# Patient Record
Sex: MALE | Race: WHITE | NOT HISPANIC OR LATINO | Employment: OTHER | URBAN - METROPOLITAN AREA
[De-identification: names, ages, dates, MRNs, and addresses within clinical notes are randomized per-mention and may not be internally consistent; named-entity substitution may affect disease eponyms.]

---

## 2018-06-29 PROCEDURE — 99291 CRITICAL CARE FIRST HOUR: CPT

## 2018-06-30 ENCOUNTER — HOSPITAL ENCOUNTER (INPATIENT)
Facility: MEDICAL CENTER | Age: 83
LOS: 3 days | DRG: 857 | End: 2018-07-03
Attending: EMERGENCY MEDICINE | Admitting: HOSPITALIST
Payer: MEDICARE

## 2018-06-30 ENCOUNTER — HOSPITAL ENCOUNTER (OUTPATIENT)
Dept: RADIOLOGY | Facility: MEDICAL CENTER | Age: 83
End: 2018-06-30

## 2018-06-30 DIAGNOSIS — T81.40XA POSTOPERATIVE INFECTION, INITIAL ENCOUNTER: ICD-10-CM

## 2018-06-30 PROBLEM — L02.519 CELLULITIS AND ABSCESS OF HAND: Status: ACTIVE | Noted: 2018-06-30

## 2018-06-30 PROBLEM — L03.119 CELLULITIS AND ABSCESS OF HAND: Status: ACTIVE | Noted: 2018-06-30

## 2018-06-30 PROBLEM — I25.10 CAD (CORONARY ARTERY DISEASE): Status: ACTIVE | Noted: 2018-06-30

## 2018-06-30 PROBLEM — I48.91 ATRIAL FIBRILLATION (HCC): Status: ACTIVE | Noted: 2018-06-30

## 2018-06-30 LAB
ANION GAP SERPL CALC-SCNC: 10 MMOL/L (ref 0–11.9)
BASOPHILS # BLD AUTO: 0.7 % (ref 0–1.8)
BASOPHILS # BLD: 0.04 K/UL (ref 0–0.12)
BUN SERPL-MCNC: 20 MG/DL (ref 8–22)
CALCIUM SERPL-MCNC: 9.1 MG/DL (ref 8.5–10.5)
CHLORIDE SERPL-SCNC: 107 MMOL/L (ref 96–112)
CO2 SERPL-SCNC: 21 MMOL/L (ref 20–33)
CREAT SERPL-MCNC: 1.21 MG/DL (ref 0.5–1.4)
CRP SERPL HS-MCNC: 1.92 MG/DL (ref 0–0.75)
EOSINOPHIL # BLD AUTO: 0.2 K/UL (ref 0–0.51)
EOSINOPHIL NFR BLD: 3.5 % (ref 0–6.9)
ERYTHROCYTE [DISTWIDTH] IN BLOOD BY AUTOMATED COUNT: 42.9 FL (ref 35.9–50)
ERYTHROCYTE [SEDIMENTATION RATE] IN BLOOD BY WESTERGREN METHOD: 50 MM/HOUR (ref 0–20)
EST. AVERAGE GLUCOSE BLD GHB EST-MCNC: 128 MG/DL
GLUCOSE SERPL-MCNC: 110 MG/DL (ref 65–99)
GRAM STN SPEC: NORMAL
GRAM STN SPEC: NORMAL
HBA1C MFR BLD: 6.1 % (ref 0–5.6)
HCT VFR BLD AUTO: 35.8 % (ref 42–52)
HGB BLD-MCNC: 11.8 G/DL (ref 14–18)
IMM GRANULOCYTES # BLD AUTO: 0.01 K/UL (ref 0–0.11)
IMM GRANULOCYTES NFR BLD AUTO: 0.2 % (ref 0–0.9)
LYMPHOCYTES # BLD AUTO: 1.25 K/UL (ref 1–4.8)
LYMPHOCYTES NFR BLD: 22 % (ref 22–41)
MCH RBC QN AUTO: 31.4 PG (ref 27–33)
MCHC RBC AUTO-ENTMCNC: 33 G/DL (ref 33.7–35.3)
MCV RBC AUTO: 95.2 FL (ref 81.4–97.8)
MONOCYTES # BLD AUTO: 0.53 K/UL (ref 0–0.85)
MONOCYTES NFR BLD AUTO: 9.3 % (ref 0–13.4)
NEUTROPHILS # BLD AUTO: 3.65 K/UL (ref 1.82–7.42)
NEUTROPHILS NFR BLD: 64.3 % (ref 44–72)
NRBC # BLD AUTO: 0 K/UL
NRBC BLD-RTO: 0 /100 WBC
PLATELET # BLD AUTO: 228 K/UL (ref 164–446)
PMV BLD AUTO: 9.1 FL (ref 9–12.9)
POTASSIUM SERPL-SCNC: 4 MMOL/L (ref 3.6–5.5)
RBC # BLD AUTO: 3.76 M/UL (ref 4.7–6.1)
SIGNIFICANT IND 70042: NORMAL
SIGNIFICANT IND 70042: NORMAL
SITE SITE: NORMAL
SITE SITE: NORMAL
SODIUM SERPL-SCNC: 138 MMOL/L (ref 135–145)
SOURCE SOURCE: NORMAL
SOURCE SOURCE: NORMAL
WBC # BLD AUTO: 5.7 K/UL (ref 4.8–10.8)

## 2018-06-30 PROCEDURE — 85025 COMPLETE CBC W/AUTO DIFF WBC: CPT

## 2018-06-30 PROCEDURE — 501838 HCHG SUTURE GENERAL: Performed by: ORTHOPAEDIC SURGERY

## 2018-06-30 PROCEDURE — A9270 NON-COVERED ITEM OR SERVICE: HCPCS | Performed by: ORTHOPAEDIC SURGERY

## 2018-06-30 PROCEDURE — 83036 HEMOGLOBIN GLYCOSYLATED A1C: CPT

## 2018-06-30 PROCEDURE — 160048 HCHG OR STATISTICAL LEVEL 1-5: Performed by: ORTHOPAEDIC SURGERY

## 2018-06-30 PROCEDURE — 87075 CULTR BACTERIA EXCEPT BLOOD: CPT

## 2018-06-30 PROCEDURE — A6407 PACKING STRIPS, NON-IMPREG: HCPCS | Performed by: ORTHOPAEDIC SURGERY

## 2018-06-30 PROCEDURE — 160002 HCHG RECOVERY MINUTES (STAT): Performed by: ORTHOPAEDIC SURGERY

## 2018-06-30 PROCEDURE — 87015 SPECIMEN INFECT AGNT CONCNTJ: CPT

## 2018-06-30 PROCEDURE — 160038 HCHG SURGERY MINUTES - EA ADDL 1 MIN LEVEL 2: Performed by: ORTHOPAEDIC SURGERY

## 2018-06-30 PROCEDURE — 87070 CULTURE OTHR SPECIMN AEROBIC: CPT

## 2018-06-30 PROCEDURE — 0JDK0ZZ EXTRACTION OF LEFT HAND SUBCUTANEOUS TISSUE AND FASCIA, OPEN APPROACH: ICD-10-PCS | Performed by: ORTHOPAEDIC SURGERY

## 2018-06-30 PROCEDURE — 99222 1ST HOSP IP/OBS MODERATE 55: CPT | Mod: AI | Performed by: HOSPITALIST

## 2018-06-30 PROCEDURE — 500881 HCHG PACK, EXTREMITY: Performed by: ORTHOPAEDIC SURGERY

## 2018-06-30 PROCEDURE — 700111 HCHG RX REV CODE 636 W/ 250 OVERRIDE (IP): Performed by: HOSPITALIST

## 2018-06-30 PROCEDURE — 86140 C-REACTIVE PROTEIN: CPT

## 2018-06-30 PROCEDURE — 85652 RBC SED RATE AUTOMATED: CPT

## 2018-06-30 PROCEDURE — 700105 HCHG RX REV CODE 258: Performed by: HOSPITALIST

## 2018-06-30 PROCEDURE — A9270 NON-COVERED ITEM OR SERVICE: HCPCS | Performed by: HOSPITALIST

## 2018-06-30 PROCEDURE — 700102 HCHG RX REV CODE 250 W/ 637 OVERRIDE(OP)

## 2018-06-30 PROCEDURE — 160027 HCHG SURGERY MINUTES - 1ST 30 MINS LEVEL 2: Performed by: ORTHOPAEDIC SURGERY

## 2018-06-30 PROCEDURE — 700102 HCHG RX REV CODE 250 W/ 637 OVERRIDE(OP): Performed by: HOSPITALIST

## 2018-06-30 PROCEDURE — 87205 SMEAR GRAM STAIN: CPT

## 2018-06-30 PROCEDURE — 160035 HCHG PACU - 1ST 60 MINS PHASE I: Performed by: ORTHOPAEDIC SURGERY

## 2018-06-30 PROCEDURE — 80048 BASIC METABOLIC PNL TOTAL CA: CPT

## 2018-06-30 PROCEDURE — 700111 HCHG RX REV CODE 636 W/ 250 OVERRIDE (IP)

## 2018-06-30 PROCEDURE — 700102 HCHG RX REV CODE 250 W/ 637 OVERRIDE(OP): Performed by: ORTHOPAEDIC SURGERY

## 2018-06-30 PROCEDURE — A9270 NON-COVERED ITEM OR SERVICE: HCPCS

## 2018-06-30 PROCEDURE — 160009 HCHG ANES TIME/MIN: Performed by: ORTHOPAEDIC SURGERY

## 2018-06-30 PROCEDURE — 770006 HCHG ROOM/CARE - MED/SURG/GYN SEMI*

## 2018-06-30 PROCEDURE — 160036 HCHG PACU - EA ADDL 30 MINS PHASE I: Performed by: ORTHOPAEDIC SURGERY

## 2018-06-30 PROCEDURE — 501330 HCHG SET, CYSTO IRRIG TUBING: Performed by: ORTHOPAEDIC SURGERY

## 2018-06-30 PROCEDURE — 94760 N-INVAS EAR/PLS OXIMETRY 1: CPT

## 2018-06-30 RX ORDER — ASPIRIN 81 MG/1
81 TABLET, CHEWABLE ORAL DAILY
COMMUNITY
End: 2023-07-13

## 2018-06-30 RX ORDER — METOPROLOL TARTRATE 50 MG/1
50 TABLET, FILM COATED ORAL 2 TIMES DAILY
Status: DISCONTINUED | OUTPATIENT
Start: 2018-06-30 | End: 2018-06-30

## 2018-06-30 RX ORDER — BISACODYL 10 MG
10 SUPPOSITORY, RECTAL RECTAL
Status: DISCONTINUED | OUTPATIENT
Start: 2018-06-30 | End: 2018-07-03 | Stop reason: HOSPADM

## 2018-06-30 RX ORDER — SODIUM CHLORIDE 9 MG/ML
INJECTION, SOLUTION INTRAVENOUS CONTINUOUS
Status: DISCONTINUED | OUTPATIENT
Start: 2018-06-30 | End: 2018-07-03 | Stop reason: HOSPADM

## 2018-06-30 RX ORDER — ATORVASTATIN CALCIUM 40 MG/1
40 TABLET, FILM COATED ORAL
Status: DISCONTINUED | OUTPATIENT
Start: 2018-06-30 | End: 2018-07-03 | Stop reason: HOSPADM

## 2018-06-30 RX ORDER — ASPIRIN 81 MG/1
81 TABLET, CHEWABLE ORAL DAILY
Status: DISCONTINUED | OUTPATIENT
Start: 2018-06-30 | End: 2018-07-03 | Stop reason: HOSPADM

## 2018-06-30 RX ORDER — MELOXICAM 10 MG/1
20 CAPSULE ORAL DAILY
COMMUNITY
End: 2023-07-13

## 2018-06-30 RX ORDER — VERAPAMIL HYDROCHLORIDE 240 MG/1
240 TABLET, FILM COATED, EXTENDED RELEASE ORAL DAILY
COMMUNITY
End: 2023-07-13

## 2018-06-30 RX ORDER — ASPIRIN 325 MG
325 TABLET ORAL EVERY 6 HOURS PRN
Status: DISCONTINUED | OUTPATIENT
Start: 2018-06-30 | End: 2018-07-03 | Stop reason: HOSPADM

## 2018-06-30 RX ORDER — AMOXICILLIN 250 MG
2 CAPSULE ORAL 2 TIMES DAILY
Status: DISCONTINUED | OUTPATIENT
Start: 2018-06-30 | End: 2018-07-03 | Stop reason: HOSPADM

## 2018-06-30 RX ORDER — ACETAMINOPHEN 325 MG/1
650 TABLET ORAL EVERY 6 HOURS PRN
Status: DISCONTINUED | OUTPATIENT
Start: 2018-06-30 | End: 2018-07-03 | Stop reason: HOSPADM

## 2018-06-30 RX ORDER — ATORVASTATIN CALCIUM 20 MG/1
40 TABLET, FILM COATED ORAL NIGHTLY
COMMUNITY
End: 2023-07-13

## 2018-06-30 RX ORDER — OXYCODONE HCL 5 MG/5 ML
SOLUTION, ORAL ORAL
Status: COMPLETED
Start: 2018-06-30 | End: 2018-06-30

## 2018-06-30 RX ORDER — ONDANSETRON 4 MG/1
4 TABLET, ORALLY DISINTEGRATING ORAL EVERY 4 HOURS PRN
Status: DISCONTINUED | OUTPATIENT
Start: 2018-06-30 | End: 2018-07-03 | Stop reason: HOSPADM

## 2018-06-30 RX ORDER — VERAPAMIL HYDROCHLORIDE 240 MG/1
240 TABLET, FILM COATED, EXTENDED RELEASE ORAL
Status: DISCONTINUED | OUTPATIENT
Start: 2018-06-30 | End: 2018-07-03 | Stop reason: HOSPADM

## 2018-06-30 RX ORDER — MAGNESIUM HYDROXIDE 1200 MG/15ML
LIQUID ORAL
Status: COMPLETED | OUTPATIENT
Start: 2018-06-30 | End: 2018-06-30

## 2018-06-30 RX ORDER — ONDANSETRON 2 MG/ML
4 INJECTION INTRAMUSCULAR; INTRAVENOUS EVERY 4 HOURS PRN
Status: DISCONTINUED | OUTPATIENT
Start: 2018-06-30 | End: 2018-07-03 | Stop reason: HOSPADM

## 2018-06-30 RX ORDER — CLOPIDOGREL BISULFATE 75 MG/1
75 TABLET ORAL DAILY
Status: DISCONTINUED | OUTPATIENT
Start: 2018-06-30 | End: 2018-06-30

## 2018-06-30 RX ORDER — POLYETHYLENE GLYCOL 3350 17 G/17G
1 POWDER, FOR SOLUTION ORAL
Status: DISCONTINUED | OUTPATIENT
Start: 2018-06-30 | End: 2018-07-03 | Stop reason: HOSPADM

## 2018-06-30 RX ORDER — SIMVASTATIN 20 MG
40 TABLET ORAL EVERY EVENING
Status: DISCONTINUED | OUTPATIENT
Start: 2018-06-30 | End: 2018-06-30

## 2018-06-30 RX ADMIN — STANDARDIZED SENNA CONCENTRATE AND DOCUSATE SODIUM 2 TABLET: 8.6; 5 TABLET, FILM COATED ORAL at 20:31

## 2018-06-30 RX ADMIN — AMPICILLIN SODIUM AND SULBACTAM SODIUM 3 G: 2; 1 INJECTION, POWDER, FOR SOLUTION INTRAMUSCULAR; INTRAVENOUS at 17:23

## 2018-06-30 RX ADMIN — FENTANYL CITRATE 50 MCG: 50 INJECTION, SOLUTION INTRAMUSCULAR; INTRAVENOUS at 03:25

## 2018-06-30 RX ADMIN — AMPICILLIN SODIUM AND SULBACTAM SODIUM 3 G: 2; 1 INJECTION, POWDER, FOR SOLUTION INTRAMUSCULAR; INTRAVENOUS at 11:26

## 2018-06-30 RX ADMIN — ATORVASTATIN CALCIUM 40 MG: 40 TABLET, FILM COATED ORAL at 20:31

## 2018-06-30 RX ADMIN — AMPICILLIN SODIUM AND SULBACTAM SODIUM 3 G: 2; 1 INJECTION, POWDER, FOR SOLUTION INTRAMUSCULAR; INTRAVENOUS at 06:30

## 2018-06-30 RX ADMIN — FENTANYL CITRATE 50 MCG: 50 INJECTION, SOLUTION INTRAMUSCULAR; INTRAVENOUS at 03:15

## 2018-06-30 RX ADMIN — SODIUM CHLORIDE: 9 INJECTION, SOLUTION INTRAVENOUS at 04:10

## 2018-06-30 RX ADMIN — VANCOMYCIN HYDROCHLORIDE 2100 MG: 100 INJECTION, POWDER, LYOPHILIZED, FOR SOLUTION INTRAVENOUS at 04:52

## 2018-06-30 RX ADMIN — ASPIRIN 81 MG: 81 TABLET, CHEWABLE ORAL at 11:20

## 2018-06-30 RX ADMIN — VERAPAMIL HYDROCHLORIDE 240 MG: 240 TABLET, FILM COATED, EXTENDED RELEASE ORAL at 11:20

## 2018-06-30 RX ADMIN — RIVAROXABAN 20 MG: 20 TABLET, FILM COATED ORAL at 17:23

## 2018-06-30 RX ADMIN — SODIUM CHLORIDE: 9 INJECTION, SOLUTION INTRAVENOUS at 16:19

## 2018-06-30 RX ADMIN — OXYCODONE HYDROCHLORIDE 10 MG: 5 SOLUTION ORAL at 03:12

## 2018-06-30 ASSESSMENT — COGNITIVE AND FUNCTIONAL STATUS - GENERAL
SUGGESTED CMS G CODE MODIFIER MOBILITY: CH
DAILY ACTIVITIY SCORE: 24
SUGGESTED CMS G CODE MODIFIER DAILY ACTIVITY: CH
MOBILITY SCORE: 24

## 2018-06-30 ASSESSMENT — PAIN SCALES - GENERAL
PAINLEVEL_OUTOF10: 0
PAINLEVEL_OUTOF10: 6
PAINLEVEL_OUTOF10: 0
PAINLEVEL_OUTOF10: 0
PAINLEVEL_OUTOF10: 4
PAINLEVEL_OUTOF10: 4

## 2018-06-30 ASSESSMENT — LIFESTYLE VARIABLES
TOTAL SCORE: 0
ALCOHOL_USE: YES
TOTAL SCORE: 0
HAVE PEOPLE ANNOYED YOU BY CRITICIZING YOUR DRINKING: NO
HAVE YOU EVER FELT YOU SHOULD CUT DOWN ON YOUR DRINKING: NO
EVER FELT BAD OR GUILTY ABOUT YOUR DRINKING: NO
EVER_SMOKED: NEVER
TOTAL SCORE: 0
ON A TYPICAL DAY WHEN YOU DRINK ALCOHOL HOW MANY DRINKS DO YOU HAVE: 1
CONSUMPTION TOTAL: NEGATIVE
EVER HAD A DRINK FIRST THING IN THE MORNING TO STEADY YOUR NERVES TO GET RID OF A HANGOVER: NO
HOW MANY TIMES IN THE PAST YEAR HAVE YOU HAD 5 OR MORE DRINKS IN A DAY: 0
AVERAGE NUMBER OF DAYS PER WEEK YOU HAVE A DRINK CONTAINING ALCOHOL: 4
EVER_SMOKED: NEVER

## 2018-06-30 ASSESSMENT — COPD QUESTIONNAIRES
COPD SCREENING SCORE: 2
IN THE PAST 12 MONTHS DO YOU DO LESS THAN YOU USED TO BECAUSE OF YOUR BREATHING PROBLEMS: DISAGREE/UNSURE
HAVE YOU SMOKED AT LEAST 100 CIGARETTES IN YOUR ENTIRE LIFE: NO/DON'T KNOW
COPD SCREENING SCORE: 2
DURING THE PAST 4 WEEKS HOW MUCH DID YOU FEEL SHORT OF BREATH: NONE/LITTLE OF THE TIME
HAVE YOU SMOKED AT LEAST 100 CIGARETTES IN YOUR ENTIRE LIFE: NO/DON'T KNOW
DO YOU EVER COUGH UP ANY MUCUS OR PHLEGM?: NO/ONLY WITH OCCASIONAL COLDS OR INFECTIONS
DO YOU EVER COUGH UP ANY MUCUS OR PHLEGM?: NO/ONLY WITH OCCASIONAL COLDS OR INFECTIONS
DURING THE PAST 4 WEEKS HOW MUCH DID YOU FEEL SHORT OF BREATH: NONE/LITTLE OF THE TIME

## 2018-06-30 ASSESSMENT — ENCOUNTER SYMPTOMS
CONSTITUTIONAL NEGATIVE: 1
PSYCHIATRIC NEGATIVE: 1
EYES NEGATIVE: 1
GASTROINTESTINAL NEGATIVE: 1
RESPIRATORY NEGATIVE: 1
CARDIOVASCULAR NEGATIVE: 1
NEUROLOGICAL NEGATIVE: 1

## 2018-06-30 ASSESSMENT — PAIN SCALES - WONG BAKER
WONGBAKER_NUMERICALRESPONSE: DOESN'T HURT AT ALL
WONGBAKER_NUMERICALRESPONSE: DOESN'T HURT AT ALL

## 2018-06-30 ASSESSMENT — PATIENT HEALTH QUESTIONNAIRE - PHQ9
SUM OF ALL RESPONSES TO PHQ9 QUESTIONS 1 AND 2: 0
1. LITTLE INTEREST OR PLEASURE IN DOING THINGS: NOT AT ALL
2. FEELING DOWN, DEPRESSED, IRRITABLE, OR HOPELESS: NOT AT ALL

## 2018-06-30 NOTE — ED NOTES
PT IS AAOX4, PT WAS PLACED IN ER 11. PT HAS FAMILY AT BEDSIDE. PT HAS BILT. IVS.  PTS CD WAS SENT TO XRAY.

## 2018-06-30 NOTE — PROGRESS NOTES
" Subjective:      No events since surgery.  Pain well controlled and moving hand freely.  No fevers or chills.    Objective:  Blood pressure 130/78, pulse 86, temperature 36.8 °C (98.3 °F), resp. rate 16, height 1.854 m (6' 1\"), weight 85 kg (187 lb 6.3 oz), SpO2 98 %.    Recent Labs      06/30/18   0121   WBC  5.7   RBC  3.76*   HEMOGLOBIN  11.8*   HEMATOCRIT  35.8*   MCV  95.2   MCH  31.4   MCHC  33.0*   RDW  42.9   PLATELETCT  228   MPV  9.1     Recent Labs      06/30/18   0121   SODIUM  138   POTASSIUM  4.0   CHLORIDE  107   CO2  21   GLUCOSE  110*   BUN  20   CREATININE  1.21   CALCIUM  9.1         Intake/Output Summary (Last 24 hours) at 06/30/18 0706  Last data filed at 06/30/18 0300   Gross per 24 hour   Intake              900 ml   Output               25 ml   Net              875 ml       Comfortable, no distress  Neurologically and vascularly intact with <2s cap refill and intact SLT  Dressing C/D/I    Assessment:      Doing well s/p left hand I&D    Plan:    Abx per ID - soft tissue infection only with good debridement  Dressing changes beginning tomorrow - Daily iodoform packing changes with compressive stocking  F/U with me at [facility] in 2 weeks for suture removal and wound check [phone]    "

## 2018-06-30 NOTE — ASSESSMENT & PLAN NOTE
Unclear if paroxysmal or not, continue with current rate control therapy, anticoagulated with xarelto

## 2018-06-30 NOTE — OR NURSING
Pt's spouse stated that pt had taken lopressor in past and felt unwell and pcp change bp medication to verapamil. ENMANUEL Bardales on ortho notified.

## 2018-06-30 NOTE — OP REPORT
Pre-operative Dx: 1) Left hand infection in the setting of recent thumb trigger finger release with extension into first webspace.  2) CAD with stents.  3) Afib on Xarelto  Post-operative Dx: same   Procedure:  Irrigation and debridement of left hand infection  Surgeon:  Dr. Tien Ta MD  Assistants: None  Anesthesia:  General  EBL:  Minimal  Drains:  Iodoform gauze packing  Complications:  None    Findings: Gross purulence.  Tendon in good condition.  The infection tracked into the first webspace requiring a dorsal incision for drainage and debridement in addition to the previous incision.    Indications: He is a pleasant 83 y.o. male who had trigger finger releases to the thumb and long finger of his left hand about 3 weeks ago at [facility].  He had his sutures removed about a week ago.  Everything was doing well until yesterday when he had significant swelling and pain in the left hand, about the first webspace, with purulent drainage.  He was started on Bactrim PO on Wednesday, but it continued to worsen.  He presented to the [facility] ED where they expressed some more purulence and gave him some IV antibiotics.  We discussed the options.  Given the proximity to the flexor tendon and the considerable collection with abundant purulence, I recommended I&D.  Discussed the risks of bleeding, neurovascular injury, persistent wound problems or infection, pain, and medical complications.  He elected to proceed.  He was NPO since noon.     Description of Procedure:      The patient was identified in the preoperative holding area and informed consent and site marking were confirmed. The patient was then brought to the operating room. Anesthesia was administered. Patient was positioned supine on the operative table with appropriate padding of the extremities. Sterile prepping and draping of the surgical field was completed. I performed a pre timeout to confirm we had the correct patient, side, site, presence of  necessary personal, and equipment. IV Ancef was administered.       His previous incision in the thumb flexor crease was reopened. There was a significant amount of purulence present.  Tissues were spread with scissors to get an idea for the extent of the soft tissue infection.  It tracked into the first webspace and extended into the dorsal hand.  A separate longitudinal incision was made dorsally, and additional purulence was encountered.  The purulent and necrotic tissue was debrided gently.  The superficial radial sensory nerve was not visible.  The wound was then copiously irrigated with saline through both incisions.  The tendon was exposed.  The A1 pulley had been released and the tendon was in good condition.  Hemostasis was obtained.  The dorsal wound was closed with nylon sutures.  The palmar incision was closed half way, then the remainder packed with iodoform gauze to fill the dead space and prevent abscess re accumulation.  A sterile compressive dressing was applied and he was turned over to anesthesia in stable condition.    Postoperative plan:  Elevate.  IV abx pending culture results, which may be negative as he's been on abx for more than 24 hours now.  ID consult.  OT consult for ROM and dressing education and compression.  Daily dressing changes including iodoform gauze packing.  F/U with me in 2 weeks for a wound check.  OK to continue Xarelto for afib.

## 2018-06-30 NOTE — PROGRESS NOTES
Pharmacy Kinetics Addendum:    83 y.o. male on vancomycin day # 1   6/30/2018    Currently on Vancomycin 1300 mg iv q24hr to start 7/1 (0500)   6/30: Vanco load 2100 mg IV at 0452    Indication for Treatment: Empiric - L hand post-surgical site infection    Pertinent cultures to date:  6/30: L hand tissue cx - in process    Recent Labs      06/30/18   0121   BUN  20   CREATININE  1.21     No results for input(s): VANCOTROUGH, VANCOPEAK, VANCORANDOM in the last 72 hours.    A/P   1. Vancomycin dose change: Continue current regimen  2. Next vancomycin level: 2 days (not currently ordered)  3. Goal trough: 12-16 mcg/mL  4. Comments: Reviewed AM note for vancomycin dosing and agree with prior pharmacist's evaluation & vanco dosing. Cultures in process and repeat renal labs ordered for AM per admitting MD. Will postpone vanco trough until 7/2 to evaluate vanco clearance closer to steady state. Will continue to follow cultures and recommend de-escalation of antibiotics if continued MRSA coverage is no longer indicated.    Pharmacy will continue to follow.     Mona Herman, RickD

## 2018-06-30 NOTE — ED TRIAGE NOTES
"Miguel Reyna  83 y.o. male  Chief Complaint   Patient presents with   • Wound Check     \"I had finger surgery June 4th and today my doctor sent me here because they said it was very infected and swollen. They said they want me to see an infectious disease MD.\" Pt moving all fingers, denies pain.    • Sent by MD       Pt amb to triage with steady gait for above complaint.   Pt is alert and oriented, speaking in full sentences, follows commands and responds appropriately to questions. NAD. Resp are even and unlabored.    Pt placed in senior lounge. Pt educated on triage process. Pt encouraged to alert staff for any changes.    "

## 2018-06-30 NOTE — ED PROVIDER NOTES
ED Provider Note  Chief Complaint:   Postoperative infection    HPI:  Miguel Reyna is a 83 y.o. male who presents with chief complaint of hand infection.  He had a trigger finger release procedure performed 6/4/18 that was done in [place].  He had normal healing since that time until about 3 days ago when he developed some swelling to his left hand that is progressively worsened since time of onset.  Additionally, he had some drainage from the area.  He was seen by his primary care physician yesterday and placed on antibiotics.  On review of prior emergency department records, this is listed as Bactrim and levofloxacin.  Symptoms progressively worsened, he was referred to [facility] emergency department.  Out of concern for infection, he was transferred to our facility for specialty orthopedic services not available at that facility.    He had no associated fevers.  Pain and swelling are worse with movement.  He denies left elbow pain, denies left wrist pain.  Denies history of diabetes or hyperglycemia.    Review of Systems:  See HPI for pertinent positives and negatives.    Past Medical History:   has a past medical history of Myocardial infarct (9/24/2011).    Social History:  Social History     Social History Main Topics   • Smoking status: Not on file   • Smokeless tobacco: Not on file   • Alcohol use Not on file   • Drug use: Unknown   • Sexual activity: Not on file       Surgical History:   has a past surgical history that includes other orthopedic surgery (1988).    Current Medications:  Home Medications     Reviewed by Shayna Sinha R.N. (Registered Nurse) on 06/30/18 at 0418  Med List Status: Not Addressed   Medication Last Dose Status   ascorbic acid (ASCORBIC ACID) 500 MG TABS 6/29/2018 Active   aspirin (ASA) 325 MG TABS 6/29/2018 Active   clopidogrel (PLAVIX) 75 MG TABS  Active   metoprolol (LOPRESSOR) 50 MG TABS  Active   simvastatin (ZOCOR) 40 MG TABS 6/29/2018 Active   therapeutic  "multivitamin-minerals (THERAGRAN-M) TABS 6/29/2018 Active                Allergies:  No Known Allergies    Physical Exam:  Vital Signs: /67   Pulse 91   Temp 36.5 °C (97.7 °F)   Resp (!) 23   Ht 1.854 m (6' 1\")   Wt 85 kg (187 lb 6.3 oz)   SpO2 97%   BMI 24.72 kg/m²   Constitutional: Alert, no acute distress  HENT: Normocephalic, atraumatic  Neck: Supple  Cardiovascular: Left upper extremity is well perfused  Pulmonary: No respiratory distress, normal work of breathing  Musculoskeletal: Patient arrives with bandage in place to the left hand.  He was taken to the operating room by Dr. Ta, orthopedic surgeon before I was able to remove the bandage for full examination.  Psychiatric: Normal and appropriate mood and affect    Medical records reviewed from [facility].  CT of the hand demonstrates air raising concern for gas-forming organism.  White blood count is 7.9.  CT demonstrates collection of air 1.5 x 1 cm area between the thumb and second finger, residual air from surgery or gas-forming infectious process.  No abscess.    Labs:  Labs Reviewed   CBC WITH DIFFERENTIAL - Abnormal; Notable for the following:        Result Value    RBC 3.76 (*)     Hemoglobin 11.8 (*)     Hematocrit 35.8 (*)     MCHC 33.0 (*)     All other components within normal limits   BASIC METABOLIC PANEL - Abnormal; Notable for the following:     Glucose 110 (*)     All other components within normal limits   WESTERGREN SED RATE - Abnormal; Notable for the following:     Sed Rate Westergren 50 (*)     All other components within normal limits   CRP QUANTITIVE (NON-CARDIAC) - Abnormal; Notable for the following:     Stat C-Reactive Protein 1.92 (*)     All other components within normal limits   ESTIMATED GFR - Abnormal; Notable for the following:     GFR If Non  57 (*)     All other components within normal limits   HEMOGLOBIN A1C       Radiology:  OUTSIDE IMAGES-CT EXTREMITY LEFT   Final Result           ED " Medications Administered:  Medications   aspirin (ASA) tablet 325 mg (not administered)   clopidogrel (PLAVIX) tablet 75 mg (not administered)   metoprolol (LOPRESSOR) tablet 50 mg (not administered)   simvastatin (ZOCOR) tablet 40 mg (not administered)   NS infusion ( Intravenous New Bag 6/30/18 0410)   enoxaparin (LOVENOX) inj 40 mg (not administered)   ondansetron (ZOFRAN) syringe/vial injection 4 mg (not administered)   ondansetron (ZOFRAN ODT) dispertab 4 mg (not administered)   senna-docusate (PERICOLACE or SENOKOT S) 8.6-50 MG per tablet 2 Tab (not administered)     And   polyethylene glycol/lytes (MIRALAX) PACKET 1 Packet (not administered)     And   magnesium hydroxide (MILK OF MAGNESIA) suspension 30 mL (not administered)     And   bisacodyl (DULCOLAX) suppository 10 mg (not administered)   acetaminophen (TYLENOL) tablet 650 mg (not administered)   Respiratory Care per Protocol (not administered)   MD ALERT... vancomycin per pharmacy protocol (not administered)   ampicillin/sulbactam (UNASYN) 3 g in  mL IVPB (not administered)   vancomycin 2,100 mg in  mL IVPB (not administered)   sodium chloride for irrigation 0.9 % irrigant (1,000 mL Irrigation New Bag 6/30/18 0211)   FENTANYL CITRATE (PF) 0.05 MG/ML INJ SOLN (50 mcg Intravenous Given 6/30/18 0325)   OXYCODONE HCL 5 MG/5ML PO SOLN (10 mg Oral Given 6/30/18 0312)       MDM:  Patient transferred for evaluation of postoperative infection as described above.  I initially assessed the patient, and was called out of the room to attend to a critical patient.  , orthopedic surgeon, was aware of the patient, and took him to the OR for washout.  I discussed the case with Dr. Romero, hospitalist, who kindly agrees to admit the patient after washout.    Disposition:  Admit to hospitalist in guarded condition    Final Impression:  1. Postoperative infection, initial encounter        Electronically signed by: Yvonne Shaw, 6/30/2018 12:51  AM

## 2018-06-30 NOTE — ED NOTES
AS PER SURGEON PT IS GOING TO THE OR NOW. PT WAS TOLD OF POC. REPORT WAS CALLED TO THE OR RN. PT IS IN NAD, FAMILY AT BEDSIDE.

## 2018-06-30 NOTE — OR NURSING
Pt received from the or via gurney with sr^ o2 nc to lma. Monitors on vs as documented. Pt on xerelto, afib baseline per Dr. Sanchez. poc per Dr. Ta. For on call medical physician to admit pt. Dr. Romero pagemartínez for directions.

## 2018-06-30 NOTE — CONSULTS
He is a pleasant 83 y.o. male who had trigger finger releases to the thumb and long finger of his left hand about 3 weeks ago at [facility].  He had his sutures removed about a week ago.  Everything was doing well until yesterday when he had significant swelling and pain in the left hand, about the first webspace, with purulent drainage.  He was started on Bactrim PO on Wednesday, but it continued to worsen.  He presented to the [facility] ED where they expressed some more purulence and gave him some IV antibiotics.  He's not systemically ill, but they sent him to [facility] for evaluation because of some air that was visualized in the tissues on CT.    Past Medical History:   Diagnosis Date   • Myocardial infarct 9/24/2011       Past Surgical History:   Procedure Laterality Date   • OTHER ORTHOPEDIC SURGERY  1988    Shoulder bone spur       Medications  No current facility-administered medications on file prior to encounter.      Current Outpatient Prescriptions on File Prior to Encounter   Medication Sig Dispense Refill   • clopidogrel (PLAVIX) 75 MG TABS Take 1 Tab by mouth every day. 30 Each 11   • metoprolol (LOPRESSOR) 50 MG TABS Take 1 Tab by mouth 2 Times a Day. 60 Each 11   • simvastatin (ZOCOR) 40 MG TABS Take 1 Tab by mouth every evening. 30 Each 11   • aspirin (ASA) 325 MG TABS Take 325 mg by mouth every 6 hours as needed.     • ascorbic acid (ASCORBIC ACID) 500 MG TABS Take 500 mg by mouth every day.     • therapeutic multivitamin-minerals (THERAGRAN-M) TABS Take 1 Tab by mouth every day.         Allergies  Patient has no known allergies.    ROS  All other systems were reviewed and found to be negative    No family history on file.    Social History     Social History   • Marital status:      Spouse name: N/A   • Number of children: N/A   • Years of education: N/A     Social History Main Topics   • Smoking status: Not on file   • Smokeless tobacco: Not on file   • Alcohol use Not on file   • Drug use:  Unknown   • Sexual activity: Not on file     Other Topics Concern   • Not on file     Social History Narrative   • No narrative on file       Physical Exam  Vitals  Blood pressure 124/87, pulse 76, temperature 37 °C (98.6 °F), resp. rate 20, weight 85 kg (187 lb 6.3 oz), SpO2 99 %.  General: Well Developed, Well Nourished, laying supine   HEENT: Normocephalic, atraumatic  Eyes: Anicteric   Neck: Supple, nontender, no masses  Lungs: Non labored breathing  Heart: No cyanosis.  Extremities are warm and well perfused.   Abdomen: Soft, NT, ND  Pelvis: Stable to AP and Lateral Compression  Skin: Purulent drainage from ulnar aspect of the thumb incision.  The long finger incision looks great.  Extremities: He has induration and erythema and fullness in the first webspace with quite a bit of expressible purulent material.  No tenderness along the flexor tendons or pain with thumb extension.  No Knavel signs.  No erythema or joint pain with ROM.  Neuro: SILT to both radial and ulnar thumb  Vascular: Capillary refill <2 seconds    Radiographs:  No orders to display       Laboratory Values      No results for input(s): SODIUM, POTASSIUM, CHLORIDE, CO2, GLUCOSE, BUN, CPKTOTAL in the last 72 hours.          Impression:  Postop infection of left thumb trigger release with extension into the 1st webspace     Plan:    No signs of flexor tenosynovitis or septic arthritis at this time, but he has significant amount of expressible purulence with a palpable collection in the first webspace.  I recommended I&D in the operating room.  Will be admitted to medicine postop.  Will need an ID consult for antibiotic guidance.  Discussed the risk of recurrent or persistent infection, bleeding, and neurovascular injury such as numbness or neuropathic pain.  Also risks such as heart attack, stroke, or other medical problem around the time of surgery.

## 2018-06-30 NOTE — PROGRESS NOTES
"Pharmacy Kinetics 83 y.o. male on vancomycin day # 1 2018    Currently on Vancomycin 2100 mg once followed by 1300 mg (15 mg/kg) iv q24hr (0500)    Indication for Treatment: SSTI    Pertinent history per medical record: Admitted on 2018 for hand abscess. Patient had a trigger finger release procedure preformed on 18 in [place], patient states he had normal healing until 3 days ago. The patient had increase in pain and swelling. The patient was placed on bactrim and levofloxacin. I&D was preformed on 18, with gross purulence noted. Empiric antibiotics started.     Other antibiotics: Unasyn 3 g IV Q 6hr     Allergies: Patient has no known allergies.     List concerns for renal function:   Age    Pertinent cultures to date:   None to date, pending    Recent Labs      18   0121   WBC  5.7   NEUTSPOLYS  64.30     Recent Labs      18   0121   BUN  20   CREATININE  1.21     No results for input(s): VANCOTROUGH, VANCOPEAK, VANCORANDOM in the last 72 hours.  Intake/Output Summary (Last 24 hours) at 18 0436  Last data filed at 18 0300   Gross per 24 hour   Intake              900 ml   Output               25 ml   Net              875 ml      Blood pressure 132/67, pulse 91, temperature 36.5 °C (97.7 °F), resp. rate (!) 23, height 1.854 m (6' 1\"), weight 85 kg (187 lb 6.3 oz), SpO2 97 %. Temp (24hrs), Av.8 °C (98.2 °F), Min:36.5 °C (97.7 °F), Max:37.2 °C (98.9 °F)      A/P   1. Vancomycin dose change: New start  2. Next vancomycin level: Prior to the second dose (not ordered in Epic)   3. Goal trough: 12-16 mcg/mL  4. Comments: Patient has little concerns for accumulation. Pharmacy to follow, monitor and recommend de-escalation when clinically appropriate.     Sybil Ahumada, PharmD      "

## 2018-06-30 NOTE — H&P
Hospital Medicine History & Physical Note    Date of Service  6/30/2018    Primary Care Physician  Pcp Unobtainable By     Consultants  Orthopedic surgery Dr. Ta    Code Status  Full code    Chief Complaint  Hand pain with drainage    History of Presenting Illness  83 y.o. male with history of atrial fibrillation on rate control, and anticoagulated, prior myocardial infarction, prior prostate cancer with treatment, was in his usual state of health until several weeks prior to this admission, when he had trigger finger release surgery on his left hand. He had a surgery in [place], and then plan to go on a prolonged vacation to [place], and was told that he could have his stitches taken out at that point. During stitch removal, it was noted that he had some swelling, which gradually increased until the few days prior to this admission, and was accompanied by drainage of some pus and blood. He subsequently was evaluated by an orthopedic surgeon in outside clinic, who sent him to the emergency department. At that time, pus was expressed, however it was felt that he would need higher level of care and was transferred to this facility. He was evaluated quickly in the emergency department by orthopedic surgery and then taken to the operating room for incision and drainage of the same. He currently denies any pain in the hand, he is able to move his fingers, he has no other complaints.    Review of Systems  Review of Systems   Constitutional: Negative.    HENT: Negative.    Eyes: Negative.    Respiratory: Negative.    Cardiovascular: Negative.    Gastrointestinal: Negative.    Genitourinary: Negative.    Musculoskeletal: Positive for joint pain.   Skin: Negative.    Neurological: Negative.    Endo/Heme/Allergies: Negative.    Psychiatric/Behavioral: Negative.        Past Medical History   has a past medical history of Cancer (HCC); Myocardial infarct (HCC) (9/24/2011); and Prostate CA (HCC)  (01/01/2018).    Surgical History   has a past surgical history that includes other orthopedic surgery (1988) and turp-vapor (10/01/2012).     Family History  family history includes Heart Disease in his father and mother.     Social History   reports that he has never smoked. He has never used smokeless tobacco. He reports that he drinks alcohol. He reports that he does not use drugs.    Allergies  No Known Allergies    Medications  Prior to Admission Medications   Prescriptions Last Dose Informant Patient Reported? Taking?   Meloxicam 10 MG Cap 6/29/2018 at 1800 Patient Yes Yes   Sig: Take 20 mg by mouth every day.   ascorbic acid (ASCORBIC ACID) 500 MG TABS 6/29/2018 at 0830  Yes No   Sig: Take 500 mg by mouth every day.   aspirin (ASA) 325 MG TABS 6/29/2018 at 0830  Yes No   Sig: Take 325 mg by mouth every 6 hours as needed.   clopidogrel (PLAVIX) 75 MG TABS 10/1/12  No No   Sig: Take 1 Tab by mouth every day.   metoprolol (LOPRESSOR) 50 MG TABS 6/1/17  No No   Sig: Take 1 Tab by mouth 2 Times a Day.   simvastatin (ZOCOR) 40 MG TABS 6/29/2018 at 1800  No No   Sig: Take 1 Tab by mouth every evening.   therapeutic multivitamin-minerals (THERAGRAN-M) TABS 6/29/2018 at 0830  Yes No   Sig: Take 1 Tab by mouth every day.   verapamil ER (CALAN-SR) 240 MG Tab CR 6/29/2018 at 1800 Patient Yes Yes   Sig: Take 240 mg by mouth every day.      Facility-Administered Medications: None       Physical Exam  Blood Pressure : 136/63   Temperature: 36.1 °C (97 °F)   Pulse: 97   Respiration: 17   Pulse Oximetry: 94 %     Physical Exam   Constitutional: He is oriented to person, place, and time. He appears well-developed and well-nourished. No distress.   HENT:   Head: Normocephalic and atraumatic.   Eyes: Conjunctivae are normal. Pupils are equal, round, and reactive to light.   Neck: Normal range of motion. Neck supple. No tracheal deviation present. No thyromegaly present.   Cardiovascular: Normal rate, regular rhythm and normal  heart sounds.  Exam reveals no gallop and no friction rub.    No murmur heard.  Pulmonary/Chest: Effort normal and breath sounds normal. No respiratory distress. He has no wheezes.   Abdominal: Soft. Bowel sounds are normal. He exhibits no distension and no mass. There is no tenderness. There is no rebound and no guarding.   Musculoskeletal: Normal range of motion. He exhibits edema and tenderness.   Left hand and bandaged, fingers intact, normal movements noted.    Lymphadenopathy:     He has no cervical adenopathy.   Neurological: He is alert and oriented to person, place, and time. No cranial nerve deficit.   Skin: Skin is warm and dry. He is not diaphoretic. There is erythema.   Nursing note and vitals reviewed.      Laboratory:  Recent Labs      06/30/18   0121   WBC  5.7   RBC  3.76*   HEMOGLOBIN  11.8*   HEMATOCRIT  35.8*   MCV  95.2   MCH  31.4   MCHC  33.0*   RDW  42.9   PLATELETCT  228   MPV  9.1     Recent Labs      06/30/18   0121   SODIUM  138   POTASSIUM  4.0   CHLORIDE  107   CO2  21   GLUCOSE  110*   BUN  20   CREATININE  1.21   CALCIUM  9.1     Recent Labs      06/30/18   0121   GLUCOSE  110*                 Lab Results   Component Value Date    TROPONINI 17.60 (H) 09/27/2011       Urinalysis:    No results found for: SPECGRAVITY, GLUCOSEUR, KETONES, NITRITE, WBCURINE, RBCURINE, BACTERIA, EPITHELCELL     Imaging:  OUTSIDE IMAGES-CT EXTREMITY LEFT   Final Result      CT scan from outside facility with air raising concern for gas-forming organism in the soft tissues, collection of air 1.5 x 1 cm between the thumb and 2nd finger no abscess.      Assessment/Plan:  I anticipate this patient will require at least two midnights for appropriate medical management, necessitating inpatient admission.    * Cellulitis and abscess of hand   Assessment & Plan    Status post incision and drainage, this was a result of a recent trigger finger release surgery. Appreciate any further recommendations from orthopedic  surgery. Await cultures, continue antibiotics.        CAD (coronary artery disease)   Assessment & Plan    By history with prior myocardial infarction.  Continue current medication management.         Atrial fibrillation (HCC)   Assessment & Plan    Unclear if paroxysmal or not, continue with current rate control therapy, anticoagulated with xarelto        Hypercholesteremia- (present on admission)   Assessment & Plan    Continue current treatment regimen with simvastatin            VTE prophylaxis: SCD, lovenox

## 2018-07-01 PROBLEM — R73.9 HYPERGLYCEMIA: Status: ACTIVE | Noted: 2018-07-01

## 2018-07-01 PROBLEM — D64.9 ANEMIA: Status: ACTIVE | Noted: 2018-07-01

## 2018-07-01 LAB
ANION GAP SERPL CALC-SCNC: 6 MMOL/L (ref 0–11.9)
BASOPHILS # BLD AUTO: 0.1 % (ref 0–1.8)
BASOPHILS # BLD: 0.01 K/UL (ref 0–0.12)
BUN SERPL-MCNC: 21 MG/DL (ref 8–22)
CALCIUM SERPL-MCNC: 8.5 MG/DL (ref 8.5–10.5)
CHLORIDE SERPL-SCNC: 108 MMOL/L (ref 96–112)
CO2 SERPL-SCNC: 24 MMOL/L (ref 20–33)
CREAT SERPL-MCNC: 1.32 MG/DL (ref 0.5–1.4)
EOSINOPHIL # BLD AUTO: 0.05 K/UL (ref 0–0.51)
EOSINOPHIL NFR BLD: 0.7 % (ref 0–6.9)
ERYTHROCYTE [DISTWIDTH] IN BLOOD BY AUTOMATED COUNT: 42.3 FL (ref 35.9–50)
GLUCOSE SERPL-MCNC: 131 MG/DL (ref 65–99)
HCT VFR BLD AUTO: 31.3 % (ref 42–52)
HGB BLD-MCNC: 10.4 G/DL (ref 14–18)
IMM GRANULOCYTES # BLD AUTO: 0.03 K/UL (ref 0–0.11)
IMM GRANULOCYTES NFR BLD AUTO: 0.4 % (ref 0–0.9)
LYMPHOCYTES # BLD AUTO: 0.76 K/UL (ref 1–4.8)
LYMPHOCYTES NFR BLD: 10.5 % (ref 22–41)
MCH RBC QN AUTO: 31.6 PG (ref 27–33)
MCHC RBC AUTO-ENTMCNC: 33.2 G/DL (ref 33.7–35.3)
MCV RBC AUTO: 95.1 FL (ref 81.4–97.8)
MONOCYTES # BLD AUTO: 0.57 K/UL (ref 0–0.85)
MONOCYTES NFR BLD AUTO: 7.9 % (ref 0–13.4)
NEUTROPHILS # BLD AUTO: 5.81 K/UL (ref 1.82–7.42)
NEUTROPHILS NFR BLD: 80.4 % (ref 44–72)
NRBC # BLD AUTO: 0 K/UL
NRBC BLD-RTO: 0 /100 WBC
PLATELET # BLD AUTO: 214 K/UL (ref 164–446)
PMV BLD AUTO: 9.3 FL (ref 9–12.9)
POTASSIUM SERPL-SCNC: 4.3 MMOL/L (ref 3.6–5.5)
RBC # BLD AUTO: 3.29 M/UL (ref 4.7–6.1)
SODIUM SERPL-SCNC: 138 MMOL/L (ref 135–145)
WBC # BLD AUTO: 7.2 K/UL (ref 4.8–10.8)

## 2018-07-01 PROCEDURE — 99223 1ST HOSP IP/OBS HIGH 75: CPT | Performed by: INTERNAL MEDICINE

## 2018-07-01 PROCEDURE — G8989 SELF CARE D/C STATUS: HCPCS | Mod: CH

## 2018-07-01 PROCEDURE — 700102 HCHG RX REV CODE 250 W/ 637 OVERRIDE(OP): Performed by: HOSPITALIST

## 2018-07-01 PROCEDURE — 770006 HCHG ROOM/CARE - MED/SURG/GYN SEMI*

## 2018-07-01 PROCEDURE — 97165 OT EVAL LOW COMPLEX 30 MIN: CPT

## 2018-07-01 PROCEDURE — G8988 SELF CARE GOAL STATUS: HCPCS | Mod: CH

## 2018-07-01 PROCEDURE — 85025 COMPLETE CBC W/AUTO DIFF WBC: CPT

## 2018-07-01 PROCEDURE — A9270 NON-COVERED ITEM OR SERVICE: HCPCS | Performed by: HOSPITALIST

## 2018-07-01 PROCEDURE — 99233 SBSQ HOSP IP/OBS HIGH 50: CPT | Performed by: HOSPITALIST

## 2018-07-01 PROCEDURE — G8987 SELF CARE CURRENT STATUS: HCPCS | Mod: CH

## 2018-07-01 PROCEDURE — 87040 BLOOD CULTURE FOR BACTERIA: CPT

## 2018-07-01 PROCEDURE — 36415 COLL VENOUS BLD VENIPUNCTURE: CPT

## 2018-07-01 PROCEDURE — A9270 NON-COVERED ITEM OR SERVICE: HCPCS | Performed by: ORTHOPAEDIC SURGERY

## 2018-07-01 PROCEDURE — 80048 BASIC METABOLIC PNL TOTAL CA: CPT

## 2018-07-01 PROCEDURE — 700102 HCHG RX REV CODE 250 W/ 637 OVERRIDE(OP): Performed by: ORTHOPAEDIC SURGERY

## 2018-07-01 PROCEDURE — 700105 HCHG RX REV CODE 258: Performed by: HOSPITALIST

## 2018-07-01 PROCEDURE — 700111 HCHG RX REV CODE 636 W/ 250 OVERRIDE (IP): Performed by: HOSPITALIST

## 2018-07-01 RX ADMIN — ATORVASTATIN CALCIUM 40 MG: 40 TABLET, FILM COATED ORAL at 18:14

## 2018-07-01 RX ADMIN — STANDARDIZED SENNA CONCENTRATE AND DOCUSATE SODIUM 2 TABLET: 8.6; 5 TABLET, FILM COATED ORAL at 18:14

## 2018-07-01 RX ADMIN — AMPICILLIN SODIUM AND SULBACTAM SODIUM 3 G: 2; 1 INJECTION, POWDER, FOR SOLUTION INTRAMUSCULAR; INTRAVENOUS at 11:58

## 2018-07-01 RX ADMIN — SODIUM CHLORIDE: 9 INJECTION, SOLUTION INTRAVENOUS at 05:52

## 2018-07-01 RX ADMIN — ASPIRIN 81 MG: 81 TABLET, CHEWABLE ORAL at 08:19

## 2018-07-01 RX ADMIN — AMPICILLIN SODIUM AND SULBACTAM SODIUM 3 G: 2; 1 INJECTION, POWDER, FOR SOLUTION INTRAMUSCULAR; INTRAVENOUS at 18:04

## 2018-07-01 RX ADMIN — AMPICILLIN SODIUM AND SULBACTAM SODIUM 3 G: 2; 1 INJECTION, POWDER, FOR SOLUTION INTRAMUSCULAR; INTRAVENOUS at 01:28

## 2018-07-01 RX ADMIN — RIVAROXABAN 20 MG: 20 TABLET, FILM COATED ORAL at 18:14

## 2018-07-01 RX ADMIN — AMPICILLIN SODIUM AND SULBACTAM SODIUM 3 G: 2; 1 INJECTION, POWDER, FOR SOLUTION INTRAMUSCULAR; INTRAVENOUS at 05:16

## 2018-07-01 RX ADMIN — SODIUM CHLORIDE: 9 INJECTION, SOLUTION INTRAVENOUS at 20:25

## 2018-07-01 RX ADMIN — STANDARDIZED SENNA CONCENTRATE AND DOCUSATE SODIUM 2 TABLET: 8.6; 5 TABLET, FILM COATED ORAL at 08:19

## 2018-07-01 RX ADMIN — VERAPAMIL HYDROCHLORIDE 240 MG: 240 TABLET, FILM COATED, EXTENDED RELEASE ORAL at 08:25

## 2018-07-01 RX ADMIN — VANCOMYCIN HYDROCHLORIDE 1300 MG: 100 INJECTION, POWDER, LYOPHILIZED, FOR SOLUTION INTRAVENOUS at 05:52

## 2018-07-01 ASSESSMENT — ENCOUNTER SYMPTOMS
PALPITATIONS: 0
NECK PAIN: 0
CHILLS: 0
SHORTNESS OF BREATH: 0
NAUSEA: 0
DEPRESSION: 0
LOSS OF CONSCIOUSNESS: 0
VOMITING: 0
BACK PAIN: 0
HEADACHES: 0
ABDOMINAL PAIN: 0
WHEEZING: 0
COUGH: 0
SORE THROAT: 0
CONSTIPATION: 0
FOCAL WEAKNESS: 0
SPUTUM PRODUCTION: 0
CLAUDICATION: 0
EYE DISCHARGE: 0
DIZZINESS: 0
SENSORY CHANGE: 0
SPEECH CHANGE: 0
WEAKNESS: 0
FEVER: 0
DIARRHEA: 0
HEMOPTYSIS: 0
EYE PAIN: 0
MYALGIAS: 0
BRUISES/BLEEDS EASILY: 0
DIAPHORESIS: 0

## 2018-07-01 ASSESSMENT — COGNITIVE AND FUNCTIONAL STATUS - GENERAL
SUGGESTED CMS G CODE MODIFIER DAILY ACTIVITY: CH
DAILY ACTIVITIY SCORE: 24

## 2018-07-01 ASSESSMENT — PAIN SCALES - GENERAL
PAINLEVEL_OUTOF10: 4
PAINLEVEL_OUTOF10: 0
PAINLEVEL_OUTOF10: 4

## 2018-07-01 ASSESSMENT — PATIENT HEALTH QUESTIONNAIRE - PHQ9
2. FEELING DOWN, DEPRESSED, IRRITABLE, OR HOPELESS: NOT AT ALL
1. LITTLE INTEREST OR PLEASURE IN DOING THINGS: NOT AT ALL
SUM OF ALL RESPONSES TO PHQ9 QUESTIONS 1 AND 2: 0

## 2018-07-01 ASSESSMENT — ACTIVITIES OF DAILY LIVING (ADL): TOILETING: INDEPENDENT

## 2018-07-01 ASSESSMENT — LIFESTYLE VARIABLES: SUBSTANCE_ABUSE: 0

## 2018-07-01 NOTE — ASSESSMENT & PLAN NOTE
Hga1c 6.1%, unclear if new onset borderline diabetes.  Will determine from patient in a.m.  Continue to monitor blood sugar on BMPs.

## 2018-07-01 NOTE — PROGRESS NOTES
[facility] Hospitalist Progress Note    Date of Service: 7/1/2018    Chief Complaint  83 y.o. male admitted 6/30/2018 with H/o afib on AC, had a left 3rd finger trigger finger surgery 3 weeks ago at the [facility] in [place] where he lives.  He is in [place] for vacation.  He stated he had no problems of purulent dc until after his sutures were removed 1 week ago.  He then developed blood and purulent dc, seen by orthopedic surgeon for right hip arthritis, but told to go to ER for treatment of left hand cellulitis. He was sent to [facility] due to air seen on CT hand from [facility].    Interval Problem Update  7/1:  s/p OR I&D with Dr. Ta.  Cultures pending, gram stain with gram + cocci.  Decreased pain in left hand, patient frustrated that he ended up with a post op infection 3 weeks out.  Negative cultures thus far from OR.  Ordered BCs x2 this a.m. (patient had already started vanco/unasyn).    Consultants/Specialty  Dr. Cely Kline consulted.    Disposition  Likely home once cultures resulted.        Review of Systems   Constitutional: Negative for chills, diaphoresis, fever and malaise/fatigue.   HENT: Negative for congestion and sore throat.    Eyes: Negative for pain and discharge.   Respiratory: Negative for cough, hemoptysis, sputum production, shortness of breath and wheezing.    Cardiovascular: Negative for chest pain, palpitations, claudication and leg swelling.   Gastrointestinal: Negative for abdominal pain, constipation, diarrhea, melena, nausea and vomiting.   Genitourinary: Negative for dysuria, frequency and urgency.   Musculoskeletal: Positive for joint pain (left hand pain). Negative for back pain, myalgias and neck pain.   Skin: Negative for itching and rash.   Neurological: Negative for dizziness, sensory change, speech change, focal weakness, loss of consciousness, weakness and headaches.   Endo/Heme/Allergies: Does not bruise/bleed easily.   Psychiatric/Behavioral: Negative for depression,  substance abuse and suicidal ideas.      Physical Exam  Laboratory/Imaging   Hemodynamics  Temp (24hrs), Av.4 °C (97.5 °F), Min:36.3 °C (97.4 °F), Max:36.5 °C (97.7 °F)   Temperature: 36.4 °C (97.5 °F)  Pulse  Av.2  Min: 76  Max: 101    Blood Pressure : 130/71      Respiratory      Respiration: 16, Pulse Oximetry: 93 %             Fluids  No intake or output data in the 24 hours ending 18 1641    Nutrition  Orders Placed This Encounter   Procedures   • Diet Order Regular     Standing Status:   Standing     Number of Occurrences:   1     Order Specific Question:   Diet:     Answer:   Regular [1]     Physical Exam   Constitutional: He is oriented to person, place, and time. He appears well-developed and well-nourished. No distress.   HENT:   Head: Normocephalic and atraumatic.   Mouth/Throat: Oropharynx is clear and moist. No oropharyngeal exudate.   Eyes: Conjunctivae and EOM are normal. Pupils are equal, round, and reactive to light. Right eye exhibits no discharge. Left eye exhibits no discharge. No scleral icterus.   Neck: Normal range of motion. Neck supple. No JVD present. No tracheal deviation present. No thyromegaly present.   Cardiovascular: Normal rate, regular rhythm and normal heart sounds.  Exam reveals no gallop and no friction rub.    No murmur heard.  Pulmonary/Chest: Effort normal and breath sounds normal. No respiratory distress. He has no wheezes. He has no rales. He exhibits no tenderness.   Abdominal: Soft. Bowel sounds are normal. He exhibits no distension and no mass. There is no tenderness. There is no rebound and no guarding.   Musculoskeletal: Normal range of motion. He exhibits tenderness (left hand, has post op bandage and splint in place, able to wiggle all 5 digits, no erythema of fingers.). He exhibits no edema.   Lymphadenopathy:     He has no cervical adenopathy.   Neurological: He is alert and oriented to person, place, and time. No cranial nerve deficit. He exhibits  normal muscle tone.   Skin: Skin is warm and dry. No rash noted. He is not diaphoretic. No erythema.   Psychiatric: He has a normal mood and affect. His behavior is normal. Judgment and thought content normal.   Nursing note and vitals reviewed.      Recent Labs      06/30/18   0121  07/01/18   0234   WBC  5.7  7.2   RBC  3.76*  3.29*   HEMOGLOBIN  11.8*  10.4*   HEMATOCRIT  35.8*  31.3*   MCV  95.2  95.1   MCH  31.4  31.6   MCHC  33.0*  33.2*   RDW  42.9  42.3   PLATELETCT  228  214   MPV  9.1  9.3     Recent Labs      06/30/18   0121  07/01/18   0234   SODIUM  138  138   POTASSIUM  4.0  4.3   CHLORIDE  107  108   CO2  21  24   GLUCOSE  110*  131*   BUN  20  21   CREATININE  1.21  1.32   CALCIUM  9.1  8.5                      Assessment/Plan     * Cellulitis and abscess of hand- (present on admission)   Assessment & Plan    Status post incision and drainage, this was a result of a recent trigger finger release surgery.  Await cultures, continue antibiotics.  7/1:  s/p I&D on 6/30 by Dr. Ta.  OR cultures negative thus far.  Consulted Dr. Kline per ortho request.    Continue unasyn, vanco IV for now.  Sed rate 50.        Hyperglycemia- (present on admission)   Assessment & Plan    Hga1c 6.1%, unclear if new onset borderline diabetes.  Will determine from patient in a.m.  Continue to monitor blood sugar on BMPs.        CAD (coronary artery disease)- (present on admission)   Assessment & Plan    By history with prior myocardial infarction.  Continue current medication management.         Atrial fibrillation (HCC)- (present on admission)   Assessment & Plan    Unclear if paroxysmal or not, continue with current rate control therapy, anticoagulated with xarelto        Hypercholesteremia- (present on admission)   Assessment & Plan    Continue current treatment regimen with simvastatin          Quality-Core Measures

## 2018-07-01 NOTE — CARE PLAN
Problem: Safety  Goal: Will remain free from injury  Pt calls appropriately, treaded socks in use. Personal belongings and call light with in reach and bed is locked in lowest position.

## 2018-07-01 NOTE — PROGRESS NOTES
"Pharmacy Kinetics 83 y.o. male on vancomycin day # 2   2018    Currently on Vancomycin 1300 mg iv q24hr (0600)   : Vanco load 2100 mg IV at 0452    Indication for Treatment: Empiric - L hand post-surgical site infection    Pertinent history per medical record: Admitted on 2018 for hand abscess. Patient had a trigger finger release procedure performed on 18 in [place], patient states he had normal healing until 3 days PTA. The patient reports increased pain and swelling, was placed on PO bactrim and levofloxacin starting . Patient underwent I&D on 18 with gross purulence noted. Empiric antibiotics started for treatment of his hand infection.     Other antibiotics: ampicillin/sulbactam 3 g IV q6hrs    Allergies: Patient has no known allergies.     Concerns for renal function include age & SCr increasing since admission.    Pertinent cultures to date:   : L hand tissue cx #1 - Moderate WBCs. Rare Gram positive cocci.  : L hand tissue cx #2 - Rare growth mixed skin molina.     Recent Labs      18   0121  18   0234   WBC  5.7  7.2   NEUTSPOLYS  64.30  80.40*     Recent Labs      18   0121  18   0234   BUN  20  21   CREATININE  1.21  1.32     No results for input(s): VANCOTROUGH, VANCOPEAK, VANCORANDOM in the last 72 hours.    Intake/Output Summary (Last 24 hours) at 18 1518  Last data filed at 18 1623   Gross per 24 hour   Intake              906 ml   Output                0 ml   Net              906 ml      Blood pressure 130/71, pulse 88, temperature 36.4 °C (97.5 °F), resp. rate 16, height 1.854 m (6' 1\"), weight 85 kg (187 lb 6.3 oz), SpO2 93 %. Temp (24hrs), Av.4 °C (97.5 °F), Min:36.3 °C (97.3 °F), Max:36.5 °C (97.7 °F)      A/P   1. Vancomycin dose change: Hold vanco pending level in AM  2. Next vancomycin level:  at 0530  3. Goal trough: 12-16 mcg/mL  4. Comments: Patient remains on empiric antibiotics for hand wound, no new nursing or " provider notes in EPIC since last evaluation. No I&O data available, SCr up slightly following admission. Will continue current vancomycin regimen and plan trough in AM to evaluate and adjust as necessary. Wound cultures remain in process, will continue to follow cultures and recommend de-escalation of antibiotics if continued MRSA coverage is no longer indicated.    Pharmacy will continue to follow.     Mona Herman, PharmD

## 2018-07-01 NOTE — THERAPY
"Occupational Therapy Evaluation completed.   Functional Status:  Pt is a 84 y/o male admitted for L hand infection following thumb flexor tendon release sx in [place]. Pt was having copious amounts of fluid draining. Pt is s/p I&D of L palm near medial thumb. Dr. Ta orders for OT include Iodoform packing, dressing changes/compression and ROM education. Pt wife was present for education of hand. Pt has full ROM of L hand, was seen in room opening and closing prior to arrival. Educated patient on inflammatory response and need for wound to seal up prior to any true ROM of thumb. Pt educated to move to all other digits, irrigate with saline and packing incision 2x/day. Pt to leave on ace wrap or compression garment all times of the day, but not to wrap to point of impaired perfusion. Pt would benefit from outpatient OT if strength/joint mobility of thumb worsens at home.   Plan of Care: Patient with no further skilled OT needs in the acute care setting at this time  Discharge Recommendations:  Equipment: No Equipment Needed. Post-acute therapy Discharge to home with outpatient or home health for additional skilled therapy services.    See \"Rehab Therapy-Acute\" Patient Summary Report for complete documentation.    "

## 2018-07-02 PROBLEM — D50.9 IRON DEFICIENCY ANEMIA: Status: ACTIVE | Noted: 2018-07-01

## 2018-07-02 LAB
ALBUMIN SERPL BCP-MCNC: 2.9 G/DL (ref 3.2–4.9)
BACTERIA TISS AEROBE CULT: ABNORMAL
BACTERIA TISS AEROBE CULT: NORMAL
BASOPHILS # BLD AUTO: 0.5 % (ref 0–1.8)
BASOPHILS # BLD: 0.04 K/UL (ref 0–0.12)
BUN SERPL-MCNC: 17 MG/DL (ref 8–22)
CALCIUM SERPL-MCNC: 9.1 MG/DL (ref 8.5–10.5)
CHLORIDE SERPL-SCNC: 107 MMOL/L (ref 96–112)
CO2 SERPL-SCNC: 24 MMOL/L (ref 20–33)
CREAT SERPL-MCNC: 0.94 MG/DL (ref 0.5–1.4)
EOSINOPHIL # BLD AUTO: 0.31 K/UL (ref 0–0.51)
EOSINOPHIL NFR BLD: 3.8 % (ref 0–6.9)
ERYTHROCYTE [DISTWIDTH] IN BLOOD BY AUTOMATED COUNT: 42.6 FL (ref 35.9–50)
FERRITIN SERPL-MCNC: 42.2 NG/ML (ref 22–322)
GLUCOSE SERPL-MCNC: 108 MG/DL (ref 65–99)
GRAM STN SPEC: ABNORMAL
GRAM STN SPEC: NORMAL
HCT VFR BLD AUTO: 33.9 % (ref 42–52)
HGB BLD-MCNC: 11.2 G/DL (ref 14–18)
IMM GRANULOCYTES # BLD AUTO: 0.03 K/UL (ref 0–0.11)
IMM GRANULOCYTES NFR BLD AUTO: 0.4 % (ref 0–0.9)
IRON SATN MFR SERPL: 10 % (ref 15–55)
IRON SERPL-MCNC: 28 UG/DL (ref 50–180)
LYMPHOCYTES # BLD AUTO: 0.84 K/UL (ref 1–4.8)
LYMPHOCYTES NFR BLD: 10.3 % (ref 22–41)
MCH RBC QN AUTO: 31.8 PG (ref 27–33)
MCHC RBC AUTO-ENTMCNC: 33 G/DL (ref 33.7–35.3)
MCV RBC AUTO: 96.3 FL (ref 81.4–97.8)
MONOCYTES # BLD AUTO: 0.89 K/UL (ref 0–0.85)
MONOCYTES NFR BLD AUTO: 10.9 % (ref 0–13.4)
NEUTROPHILS # BLD AUTO: 6.07 K/UL (ref 1.82–7.42)
NEUTROPHILS NFR BLD: 74.1 % (ref 44–72)
NRBC # BLD AUTO: 0 K/UL
NRBC BLD-RTO: 0 /100 WBC
PHOSPHATE SERPL-MCNC: 3.3 MG/DL (ref 2.5–4.5)
PLATELET # BLD AUTO: 237 K/UL (ref 164–446)
PMV BLD AUTO: 9.2 FL (ref 9–12.9)
POTASSIUM SERPL-SCNC: 4 MMOL/L (ref 3.6–5.5)
RBC # BLD AUTO: 3.52 M/UL (ref 4.7–6.1)
SIGNIFICANT IND 70042: ABNORMAL
SIGNIFICANT IND 70042: NORMAL
SITE SITE: ABNORMAL
SITE SITE: NORMAL
SODIUM SERPL-SCNC: 138 MMOL/L (ref 135–145)
SOURCE SOURCE: ABNORMAL
SOURCE SOURCE: NORMAL
TIBC SERPL-MCNC: 293 UG/DL (ref 250–450)
VANCOMYCIN SERPL-MCNC: 10 UG/ML
VIT B12 SERPL-MCNC: 305 PG/ML (ref 211–911)
WBC # BLD AUTO: 8.2 K/UL (ref 4.8–10.8)

## 2018-07-02 PROCEDURE — 700111 HCHG RX REV CODE 636 W/ 250 OVERRIDE (IP): Performed by: HOSPITALIST

## 2018-07-02 PROCEDURE — 700102 HCHG RX REV CODE 250 W/ 637 OVERRIDE(OP): Performed by: ORTHOPAEDIC SURGERY

## 2018-07-02 PROCEDURE — 80069 RENAL FUNCTION PANEL: CPT

## 2018-07-02 PROCEDURE — 700102 HCHG RX REV CODE 250 W/ 637 OVERRIDE(OP): Performed by: HOSPITALIST

## 2018-07-02 PROCEDURE — 700105 HCHG RX REV CODE 258: Performed by: HOSPITALIST

## 2018-07-02 PROCEDURE — 83540 ASSAY OF IRON: CPT

## 2018-07-02 PROCEDURE — 770006 HCHG ROOM/CARE - MED/SURG/GYN SEMI*

## 2018-07-02 PROCEDURE — 85025 COMPLETE CBC W/AUTO DIFF WBC: CPT

## 2018-07-02 PROCEDURE — 82728 ASSAY OF FERRITIN: CPT

## 2018-07-02 PROCEDURE — 700102 HCHG RX REV CODE 250 W/ 637 OVERRIDE(OP): Performed by: INTERNAL MEDICINE

## 2018-07-02 PROCEDURE — 83550 IRON BINDING TEST: CPT

## 2018-07-02 PROCEDURE — A9270 NON-COVERED ITEM OR SERVICE: HCPCS | Performed by: HOSPITALIST

## 2018-07-02 PROCEDURE — 36415 COLL VENOUS BLD VENIPUNCTURE: CPT

## 2018-07-02 PROCEDURE — 82607 VITAMIN B-12: CPT

## 2018-07-02 PROCEDURE — 99233 SBSQ HOSP IP/OBS HIGH 50: CPT | Performed by: INTERNAL MEDICINE

## 2018-07-02 PROCEDURE — 99232 SBSQ HOSP IP/OBS MODERATE 35: CPT | Performed by: HOSPITALIST

## 2018-07-02 PROCEDURE — A9270 NON-COVERED ITEM OR SERVICE: HCPCS | Performed by: ORTHOPAEDIC SURGERY

## 2018-07-02 PROCEDURE — 80202 ASSAY OF VANCOMYCIN: CPT

## 2018-07-02 PROCEDURE — A9270 NON-COVERED ITEM OR SERVICE: HCPCS | Performed by: INTERNAL MEDICINE

## 2018-07-02 RX ORDER — SULFAMETHOXAZOLE AND TRIMETHOPRIM 800; 160 MG/1; MG/1
1 TABLET ORAL EVERY 12 HOURS
Status: DISCONTINUED | OUTPATIENT
Start: 2018-07-02 | End: 2018-07-03 | Stop reason: HOSPADM

## 2018-07-02 RX ORDER — ASCORBIC ACID 500 MG
500 TABLET ORAL 2 TIMES DAILY
Status: DISCONTINUED | OUTPATIENT
Start: 2018-07-02 | End: 2018-07-03 | Stop reason: HOSPADM

## 2018-07-02 RX ORDER — FERROUS SULFATE 325(65) MG
325 TABLET ORAL
Status: DISCONTINUED | OUTPATIENT
Start: 2018-07-02 | End: 2018-07-03 | Stop reason: HOSPADM

## 2018-07-02 RX ADMIN — SULFAMETHOXAZOLE AND TRIMETHOPRIM 1 TABLET: 800; 160 TABLET ORAL at 20:02

## 2018-07-02 RX ADMIN — OXYCODONE HYDROCHLORIDE AND ACETAMINOPHEN 500 MG: 500 TABLET ORAL at 20:02

## 2018-07-02 RX ADMIN — AMPICILLIN SODIUM AND SULBACTAM SODIUM 3 G: 2; 1 INJECTION, POWDER, FOR SOLUTION INTRAMUSCULAR; INTRAVENOUS at 18:01

## 2018-07-02 RX ADMIN — VANCOMYCIN HYDROCHLORIDE 1300 MG: 100 INJECTION, POWDER, LYOPHILIZED, FOR SOLUTION INTRAVENOUS at 10:20

## 2018-07-02 RX ADMIN — SODIUM CHLORIDE: 9 INJECTION, SOLUTION INTRAVENOUS at 20:04

## 2018-07-02 RX ADMIN — AMPICILLIN SODIUM AND SULBACTAM SODIUM 3 G: 2; 1 INJECTION, POWDER, FOR SOLUTION INTRAMUSCULAR; INTRAVENOUS at 00:00

## 2018-07-02 RX ADMIN — AMPICILLIN SODIUM AND SULBACTAM SODIUM 3 G: 2; 1 INJECTION, POWDER, FOR SOLUTION INTRAMUSCULAR; INTRAVENOUS at 05:18

## 2018-07-02 RX ADMIN — OXYCODONE HYDROCHLORIDE AND ACETAMINOPHEN 500 MG: 500 TABLET ORAL at 09:00

## 2018-07-02 RX ADMIN — VERAPAMIL HYDROCHLORIDE 240 MG: 240 TABLET, FILM COATED, EXTENDED RELEASE ORAL at 07:54

## 2018-07-02 RX ADMIN — Medication 325 MG: at 09:00

## 2018-07-02 RX ADMIN — AMPICILLIN SODIUM AND SULBACTAM SODIUM 3 G: 2; 1 INJECTION, POWDER, FOR SOLUTION INTRAMUSCULAR; INTRAVENOUS at 12:46

## 2018-07-02 RX ADMIN — ATORVASTATIN CALCIUM 40 MG: 40 TABLET, FILM COATED ORAL at 18:10

## 2018-07-02 RX ADMIN — RIVAROXABAN 20 MG: 20 TABLET, FILM COATED ORAL at 18:01

## 2018-07-02 RX ADMIN — ASPIRIN 81 MG: 81 TABLET, CHEWABLE ORAL at 07:54

## 2018-07-02 ASSESSMENT — PATIENT HEALTH QUESTIONNAIRE - PHQ9
2. FEELING DOWN, DEPRESSED, IRRITABLE, OR HOPELESS: NOT AT ALL
SUM OF ALL RESPONSES TO PHQ9 QUESTIONS 1 AND 2: 0
1. LITTLE INTEREST OR PLEASURE IN DOING THINGS: NOT AT ALL

## 2018-07-02 ASSESSMENT — ENCOUNTER SYMPTOMS
BRUISES/BLEEDS EASILY: 0
EYE PAIN: 0
WHEEZING: 0
WEAKNESS: 0
LOSS OF CONSCIOUSNESS: 0
PALPITATIONS: 0
NECK PAIN: 0
SENSORY CHANGE: 0
SORE THROAT: 0
CLAUDICATION: 0
SPUTUM PRODUCTION: 0
MYALGIAS: 0
COUGH: 0
ABDOMINAL PAIN: 0
EYE DISCHARGE: 0
BACK PAIN: 0
DIAPHORESIS: 0
HEMOPTYSIS: 0
SHORTNESS OF BREATH: 0
CONSTIPATION: 0
DEPRESSION: 0
DIZZINESS: 0
MYALGIAS: 1
HEADACHES: 0
NAUSEA: 0
FEVER: 0
SPEECH CHANGE: 0
DIARRHEA: 0
FOCAL WEAKNESS: 0
VOMITING: 0
CHILLS: 0

## 2018-07-02 ASSESSMENT — LIFESTYLE VARIABLES: SUBSTANCE_ABUSE: 0

## 2018-07-02 ASSESSMENT — PAIN SCALES - GENERAL
PAINLEVEL_OUTOF10: 0

## 2018-07-02 NOTE — CARE PLAN
Problem: Communication  Goal: The ability to communicate needs accurately and effectively will improve    Intervention: Educate patient and significant other/support system about the plan of care, procedures, treatments, medications and allow for questions  POC discussed with pt including unit routine, medications, and call light.      Problem: Venous Thromboembolism (VTW)/Deep Vein Thrombosis (DVT) Prevention:  Goal: Patient will participate in Venous Thrombosis (VTE)/Deep Vein Thrombosis (DVT)Prevention Measures    Intervention: Encourage ambulation/mobilization at level directed by Physical Therapy in collaboration with Interdisciplinary Team  Pt ambulating unit frequently and wearing SCDs when in bed.

## 2018-07-02 NOTE — PROGRESS NOTES
"Pharmacy Kinetics 83 y.o. male on vancomycin day # 3   2018    Currently on Vancomycin 1300 mg iv q24hr (0900)    Indication for Treatment: Empiric - L hand post-surgical site infection    Pertinent history per medical record: Admitted on 2018 for hand abscess. Patient had a trigger finger release procedure performed on 18 in [place], patient states he had normal healing until 3 days PTA. The patient reports increased pain and swelling, was placed on PO bactrim and levofloxacin starting . Patient underwent I&D on 18 with gross purulence noted. Empiric antibiotics started for treatment of his hand infection, ID consulting.     Other antibiotics: ampicillin/sulbactam 3 g IV q6hrs    Allergies: Patient has no known allergies.     Concerns for renal function include age, albumin 2.9 & recent increase in SCr.    Pertinent cultures to date:   : peripheral blood cx X2 - NGTD  : L hand tissue cx #1 - Moderate WBCs. Rare Gram positive cocci.  : L hand tissue cx #2 - Rare growth mixed skin molina.     Recent Labs      18   0121  18   0234  18   0554   WBC  5.7  7.2  8.2   NEUTSPOLYS  64.30  80.40*  74.10*     Recent Labs      18   0121  18   0234  18   0554   BUN  20  21  17   CREATININE  1.21  1.32  0.94   ALBUMIN   --    --   2.9*     Recent Labs      18   0554   VANCORANDOM  10.0     No intake or output data in the 24 hours ending 18 0821     Blood pressure 160/72, pulse 97, temperature 36.1 °C (97 °F), resp. rate 17, height 1.854 m (6' 1\"), weight 85 kg (187 lb 6.3 oz), SpO2 93 %. Temp (24hrs), Av.3 °C (97.4 °F), Min:36.1 °C (97 °F), Max:36.6 °C (97.8 °F)      A/P   1. Vancomycin dose change: Restart vancomycin 1300 mg IV q24hrs  2. Next vancomycin level: 2-3 days (not currently ordered)  3. Goal trough: 12-16 mcg/mL  4. Comments: ID consulted on patient yesterday, antibiotic plan to be determined once cultures finalized. No updates in " culture results since last evaluation. Patient's renal indices did return to baseline today, vanco trough obtained prior to steady state subtherapeutic. Anticipate patient will have accumulation of vanco with continued dosing leading to therapeutic trough results. Will restart once daily vancomycin regimen and plan repeat trough in 2-3 days to evaluate and adjust as necessary. Will follow renal function closely d/t age and recent rise in SCr on vancomycin.    Pharmacy will continue to follow.     Mona Herman, RickD

## 2018-07-02 NOTE — CONSULTS
"DATE OF SERVICE:  07/01/2018    INFECTIOUS DISEASE CONSULT    REASON FOR CONSULT:  Surgical site infection and hand infection.    CONSULTING PHYSICIAN:  Dr. Ta and Dr. Randall.    HISTORY OF PRESENT ILLNESS:  This is an 83-year-old white male who was   originally admitted to the hospital on 06/30/2018 due to infection of his left   hand.  He has a known history of atrial fibrillation on chronic   anticoagulation, coronary artery disease, prostate cancer who had undergone   multiple digit trigger release at the [facility] in [place].  He states this   is supposed to be a \"simple procedure\" and seemed to be doing well until he   had his stitches removed.  After his suture removal, he started developing   increasing pain and swelling and then went to have purulent drainage, he was   sent to the hospital for further evaluation and management.  He has been   started on vancomycin and Unasyn and infectious disease was consulted for   antibiotic recommendations and management.  He did undergo irrigation and   debridement yesterday.  Gross purulence was found.  The tendon appeared   healthy.  There is no hardware in place.  Please note his trigger finger   release was done approximately 3 weeks prior to this admission.  He did   receive antibiotics prior to admission with oral Bactrim.    REVIEW OF SYSTEMS:  All systems reviewed are negative.    ALLERGIES:  He has no known antibiotic allergies.    PAST MEDICAL HISTORY:  Prostate cancer, coronary artery disease, atrial   fibrillation on anticoagulation.    FAMILY HISTORY:  Heart disease.    SOCIAL HISTORY:  Does not smoke, drink or use illicit drugs.    PHYSICAL EXAMINATION:  GENERAL:  This is an elderly, but well nourished appearing male in no acute   distress, pleasant and cooperative.  VITAL SIGNS:  Temperature is 97.8, blood pressure 149/64, pulse 88,   respiratory rate 16 and oxygen saturation 94% on room air.  Weight is 85   kilos.  HEENT:  Normocephalic, " atraumatic.  Pupils are equal, round, and reactive   light.  Extraocular movements are intact.  Sclera is clear.  He has fair   dentition.  NECK:  Supple.  CARDIOVASCULAR:  Irregular rate and rhythm.  CHEST:  Clear to auscultation bilaterally, unlabored.  ABDOMEN:  Soft, nontender.  EXTREMITIES:  Show no cyanosis or clubbing.  The left hand is in a surgical   dressing that has been packed.  Fingers are warm.  NEUROLOGIC:  He is awake, alert and oriented.  Speech is fluent without   dysarthria.  Cranial nerves are intact.    LABORATORY DATA:  CT scan from outlying facility showed soft tissue air with   ____ 1.5x1 cm.  His initial labs show white blood cell count is 7.2, H and H   10.1 and 31, and platelets of 214.  Sodium 138, potassium is 4.3, chloride   108, bicarbonate 24, glucose 131, BUN 21 and creatinine 1.3.  Glycosylated   hemoglobin was 6.1.  Troponin was not done.  Gram stain is showing   gram-positive cocci.    ASSESSMENT AND PLAN:  An 83-year-old male status post trigger finger release 3   weeks ago at [facility] in [place] presented with gross infection of his   surgical site.  He did receive antibiotics prior to admission.  He is   currently afebrile without leukocytosis.  History is most concerning for   Staphylococcus aureus infection; however, is also at risk for Staphylococcus   epidermidis enterococcus and strep.  Would continue him on broad-spectrum   antibiotics pending final culture results.  His renal functions should be   monitored closely as he has showed a slight deterioration in his renal   function on the vancomycin ____ to switch to daptomycin.  Final   recommendations per culture results and clinical course.  Discussed with   orthopedics.    Thank you and we will follow with you.       ____________________________________     MD LEANDRA BAUTISTA / CHOLO    DD:  07/01/2018 18:56:11  DT:  07/01/2018 20:11:06    D#:  [id number]  Job#:  [id number]

## 2018-07-02 NOTE — CARE PLAN
Problem: Safety  Goal: Will remain free from injury  Call light within reach. Pt calls for assistance at all times.  Bed in lowest position, upper bedrails up, personal possessions within reach, treaded socks on.  Hourly rounding in place.          Problem: Pain Management  Goal: Pain level will decrease to patient's comfort goal  Pt denies pain/ need for pain medication

## 2018-07-02 NOTE — PROGRESS NOTES
"   Orthopaedic Progress Note    Interval changes:  Cultures positive for gram + cocci  ID consult placed   Dressing CDI    ROS - Patient denies any new issues.  Pain well controlled.    Blood pressure 130/71, pulse 88, temperature 36.4 °C (97.5 °F), resp. rate 16, height 1.854 m (6' 1\"), weight 85 kg (187 lb 6.3 oz), SpO2 93 %.      Patient seen and examined  No acute distress  Breathing non labored  RRR  Left hand dressing CDI, moves all fingers, DNVI, cap refill <2 sec.      Recent Labs      06/30/18   0121  07/01/18   0234   WBC  5.7  7.2   RBC  3.76*  3.29*   HEMOGLOBIN  11.8*  10.4*   HEMATOCRIT  35.8*  31.3*   MCV  95.2  95.1   MCH  31.4  31.6   MCHC  33.0*  33.2*   RDW  42.9  42.3   PLATELETCT  228  214   MPV  9.1  9.3       Active Hospital Problems    Diagnosis   • Hyperglycemia [R73.9]   • Anemia [D64.9]   • Cellulitis and abscess of hand [L03.119, L02.519]   • Atrial fibrillation (HCC) [I48.91]   • CAD (coronary artery disease) [I25.10]   • Hypercholesteremia [E78.00]       Assessment/Plan:  ID consult pending   Final cultures and sensitivities pending, gram + cocci present  POD#1 S/P Irrigation and debridement of left hand infection  Wt bearing status - WBAT  Wound care/Drains - daily dressing changes with iodoform gauze packing by nursing  Future Procedures - none planned   Sutures/Staples out- 10-14 days post operatively  PT/OT-initiated  Antibiotics: unasyn 3g IV Q8  DVT Prophylaxis- TEDS/SCDs/Foot pumps/xarelto  Wahl-none  Case Coordination for Discharge Planning - Disposition pending Abx needs    "

## 2018-07-02 NOTE — PROGRESS NOTES
" Subjective:      No events.  Pain controlled.  Dressing changed yesterday.  GPCs.    Objective:  Blood pressure 152/80, pulse 92, temperature 36.2 °C (97.2 °F), resp. rate 16, height 1.854 m (6' 1\"), weight 85 kg (187 lb 6.3 oz), SpO2 94 %.    Recent Labs      06/30/18   0121  07/01/18   0234  07/02/18   0554   WBC  5.7  7.2  8.2   RBC  3.76*  3.29*  3.52*   HEMOGLOBIN  11.8*  10.4*  11.2*   HEMATOCRIT  35.8*  31.3*  33.9*   MCV  95.2  95.1  96.3   MCH  31.4  31.6  31.8   MCHC  33.0*  33.2*  33.0*   RDW  42.9  42.3  42.6   PLATELETCT  228  214  237   MPV  9.1  9.3  9.2     Recent Labs      06/30/18   0121  07/01/18   0234  07/02/18   0554   SODIUM  138  138  138   POTASSIUM  4.0  4.3  4.0   CHLORIDE  107  108  107   CO2  21  24  24   GLUCOSE  110*  131*  108*   BUN  20  21  17   CREATININE  1.21  1.32  0.94   CALCIUM  9.1  8.5  9.1       No intake or output data in the 24 hours ending 07/02/18 0722    Comfortable, no distress  Dressing C/D/I  Good sensation and cap refill    Assessment:      s/p left hand I&D    Plan:      Daily dressing changes with iodoform packing  OT  Abx per ID  Can d/c whenever abx plan finalized  F/U with me in 2 weeks for suture removal and wound check  "

## 2018-07-02 NOTE — PROGRESS NOTES
[facility] Hospitalist Progress Note    Date of Service: 7/2/2018    Chief Complaint  83 y.o. male admitted 6/30/2018 with H/o afib on AC, had a left 3rd finger trigger finger surgery 3 weeks ago at the [facility] in [place] where he lives.  [place] is in [place] vacation.  He stated he had no problems of purulent dc until after his sutures were removed 1 week ago.  He then developed blood and purulent dc, seen by orthopedic surgeon for right hip arthritis, but told to go to ER for treatment of left hand cellulitis. He was sent to [facility] due to air seen on CT hand from [facility].    Interval Problem Update  7/1:  s/p OR I&D with Dr. Ta.  Cultures pending, gram stain with gram + cocci.  Decreased pain in left hand, patient frustrated that he ended up with a post op infection 3 weeks out.  Negative cultures thus far from OR.  Ordered BCs x2 this a.m. (patient had already started vanco/unasyn).  7/2:  His right hand is feeling better, however all OR cultures thus far negative.  He did take 3 doses of Bactrim prior to his OR cultures.  Spoke with patient and wife at bedside, prefer to ensure no bacterial growth for another 24 hours prior to dc home.    Consultants/Specialty  Dr. Cely Kline consulted.    Disposition  home once cultures resulted x 48 hours.  Wife and patient are understanding.          Review of Systems   Constitutional: Negative for chills, diaphoresis, fever and malaise/fatigue.   HENT: Negative for congestion and sore throat.    Eyes: Negative for pain and discharge.   Respiratory: Negative for cough, hemoptysis, sputum production, shortness of breath and wheezing.    Cardiovascular: Negative for chest pain, palpitations, claudication and leg swelling.   Gastrointestinal: Negative for abdominal pain, constipation, diarrhea, melena, nausea and vomiting.   Genitourinary: Negative for dysuria, frequency and urgency.   Musculoskeletal: Positive for joint pain (left hand pain). Negative for back pain,  myalgias and neck pain.   Skin: Negative for itching and rash.   Neurological: Negative for dizziness, sensory change, speech change, focal weakness, loss of consciousness, weakness and headaches.   Endo/Heme/Allergies: Does not bruise/bleed easily.   Psychiatric/Behavioral: Negative for depression, substance abuse and suicidal ideas.      Physical Exam  Laboratory/Imaging   Hemodynamics  Temp (24hrs), Av.3 °C (97.3 °F), Min:36.1 °C (97 °F), Max:36.6 °C (97.8 °F)   Temperature: 36.1 °C (97 °F)  Pulse  Av.6  Min: 76  Max: 101    Blood Pressure : 160/72      Respiratory      Respiration: 17, Pulse Oximetry: 93 %             Fluids    Intake/Output Summary (Last 24 hours) at 18 1516  Last data filed at 18 1300   Gross per 24 hour   Intake              240 ml   Output                0 ml   Net              240 ml       Nutrition  Orders Placed This Encounter   Procedures   • Diet Order Regular     Standing Status:   Standing     Number of Occurrences:   1     Order Specific Question:   Diet:     Answer:   Regular [1]     Physical Exam   Constitutional: He is oriented to person, place, and time. He appears well-developed and well-nourished. No distress.   HENT:   Head: Normocephalic and atraumatic.   Mouth/Throat: Oropharynx is clear and moist. No oropharyngeal exudate.   Eyes: Conjunctivae and EOM are normal. Pupils are equal, round, and reactive to light. Right eye exhibits no discharge. Left eye exhibits no discharge. No scleral icterus.   Neck: Normal range of motion. Neck supple. No JVD present. No tracheal deviation present. No thyromegaly present.   Cardiovascular: Normal rate, regular rhythm and normal heart sounds.  Exam reveals no gallop and no friction rub.    No murmur heard.  Pulmonary/Chest: Effort normal and breath sounds normal. No respiratory distress. He has no wheezes. He has no rales. He exhibits no tenderness.   Abdominal: Soft. Bowel sounds are normal. He exhibits no distension  and no mass. There is no tenderness. There is no rebound and no guarding.   Musculoskeletal: Normal range of motion. He exhibits tenderness (left hand, has post op bandage and splint in place, able to wiggle all 5 digits, no erythema of fingers.). He exhibits no edema.   Lymphadenopathy:     He has no cervical adenopathy.   Neurological: He is alert and oriented to person, place, and time. No cranial nerve deficit. He exhibits normal muscle tone.   Skin: Skin is warm and dry. No rash noted. He is not diaphoretic. No erythema.   Psychiatric: He has a normal mood and affect. His behavior is normal. Judgment and thought content normal.   Nursing note and vitals reviewed.      Recent Labs      06/30/18   0121 07/01/18   0234  07/02/18   0554   WBC  5.7  7.2  8.2   RBC  3.76*  3.29*  3.52*   HEMOGLOBIN  11.8*  10.4*  11.2*   HEMATOCRIT  35.8*  31.3*  33.9*   MCV  95.2  95.1  96.3   MCH  31.4  31.6  31.8   MCHC  33.0*  33.2*  33.0*   RDW  42.9  42.3  42.6   PLATELETCT  228  214  237   MPV  9.1  9.3  9.2     Recent Labs      06/30/18   0121 07/01/18   0234  07/02/18   0554   SODIUM  138  138  138   POTASSIUM  4.0  4.3  4.0   CHLORIDE  107  108  107   CO2  21  24  24   GLUCOSE  110*  131*  108*   BUN  20  21  17   CREATININE  1.21  1.32  0.94   CALCIUM  9.1  8.5  9.1                      Assessment/Plan     * Cellulitis and abscess of hand- (present on admission)   Assessment & Plan    Status post incision and drainage, this was a result of a recent trigger finger release surgery.  Await cultures, continue antibiotics.  7/1:  s/p I&D on 6/30 by Dr. Ta.  OR cultures negative thus far.  Consulted Dr. Kline per ortho request.    Continue unasyn, vanco IV   Sed rate 50.  7/2:  OR Wound cultures no growth thus far.        Iron deficiency anemia   Assessment & Plan     Iron level 28, started on iron and vit C.  ferritin, B12 wnl.  No obvious bleeding from OR site.        Hyperglycemia- (present on admission)   Assessment &  Plan    Hga1c 6.1%, unclear if new onset borderline diabetes.  Will determine from patient in a.m.  Continue to monitor blood sugar on BMPs.        CAD (coronary artery disease)- (present on admission)   Assessment & Plan    By history with prior myocardial infarction.  Continue current medication management.         Atrial fibrillation (HCC)- (present on admission)   Assessment & Plan    Unclear if paroxysmal or not, continue with current rate control therapy, anticoagulated with xarelto        Hypercholesteremia- (present on admission)   Assessment & Plan    Continue current treatment regimen with simvastatin          Quality-Core Measures

## 2018-07-03 VITALS
DIASTOLIC BLOOD PRESSURE: 70 MMHG | WEIGHT: 187.39 LBS | SYSTOLIC BLOOD PRESSURE: 153 MMHG | OXYGEN SATURATION: 96 % | TEMPERATURE: 97.7 F | HEIGHT: 73 IN | RESPIRATION RATE: 17 BRPM | HEART RATE: 88 BPM | BODY MASS INDEX: 24.84 KG/M2

## 2018-07-03 LAB
BACTERIA SPEC ANAEROBE CULT: NORMAL
BACTERIA SPEC ANAEROBE CULT: NORMAL
BASOPHILS # BLD AUTO: 0.4 % (ref 0–1.8)
BASOPHILS # BLD: 0.03 K/UL (ref 0–0.12)
EOSINOPHIL # BLD AUTO: 0.2 K/UL (ref 0–0.51)
EOSINOPHIL NFR BLD: 2.4 % (ref 0–6.9)
ERYTHROCYTE [DISTWIDTH] IN BLOOD BY AUTOMATED COUNT: 41.6 FL (ref 35.9–50)
HCT VFR BLD AUTO: 33.6 % (ref 42–52)
HGB BLD-MCNC: 11.5 G/DL (ref 14–18)
IMM GRANULOCYTES # BLD AUTO: 0.05 K/UL (ref 0–0.11)
IMM GRANULOCYTES NFR BLD AUTO: 0.6 % (ref 0–0.9)
LYMPHOCYTES # BLD AUTO: 0.83 K/UL (ref 1–4.8)
LYMPHOCYTES NFR BLD: 10 % (ref 22–41)
MCH RBC QN AUTO: 32 PG (ref 27–33)
MCHC RBC AUTO-ENTMCNC: 34.2 G/DL (ref 33.7–35.3)
MCV RBC AUTO: 93.6 FL (ref 81.4–97.8)
MONOCYTES # BLD AUTO: 0.99 K/UL (ref 0–0.85)
MONOCYTES NFR BLD AUTO: 11.9 % (ref 0–13.4)
NEUTROPHILS # BLD AUTO: 6.19 K/UL (ref 1.82–7.42)
NEUTROPHILS NFR BLD: 74.7 % (ref 44–72)
NRBC # BLD AUTO: 0 K/UL
NRBC BLD-RTO: 0 /100 WBC
PLATELET # BLD AUTO: 242 K/UL (ref 164–446)
PMV BLD AUTO: 9.2 FL (ref 9–12.9)
RBC # BLD AUTO: 3.59 M/UL (ref 4.7–6.1)
SIGNIFICANT IND 70042: NORMAL
SIGNIFICANT IND 70042: NORMAL
SITE SITE: NORMAL
SITE SITE: NORMAL
SOURCE SOURCE: NORMAL
SOURCE SOURCE: NORMAL
WBC # BLD AUTO: 8.3 K/UL (ref 4.8–10.8)

## 2018-07-03 PROCEDURE — 99232 SBSQ HOSP IP/OBS MODERATE 35: CPT | Performed by: INTERNAL MEDICINE

## 2018-07-03 PROCEDURE — 700105 HCHG RX REV CODE 258: Performed by: HOSPITALIST

## 2018-07-03 PROCEDURE — 700102 HCHG RX REV CODE 250 W/ 637 OVERRIDE(OP): Performed by: INTERNAL MEDICINE

## 2018-07-03 PROCEDURE — 85025 COMPLETE CBC W/AUTO DIFF WBC: CPT

## 2018-07-03 PROCEDURE — 700111 HCHG RX REV CODE 636 W/ 250 OVERRIDE (IP): Performed by: HOSPITALIST

## 2018-07-03 PROCEDURE — 36415 COLL VENOUS BLD VENIPUNCTURE: CPT

## 2018-07-03 PROCEDURE — 700102 HCHG RX REV CODE 250 W/ 637 OVERRIDE(OP): Performed by: HOSPITALIST

## 2018-07-03 PROCEDURE — 99239 HOSP IP/OBS DSCHRG MGMT >30: CPT | Performed by: INTERNAL MEDICINE

## 2018-07-03 PROCEDURE — A9270 NON-COVERED ITEM OR SERVICE: HCPCS | Performed by: INTERNAL MEDICINE

## 2018-07-03 PROCEDURE — A9270 NON-COVERED ITEM OR SERVICE: HCPCS | Performed by: HOSPITALIST

## 2018-07-03 RX ORDER — SULFAMETHOXAZOLE AND TRIMETHOPRIM 800; 160 MG/1; MG/1
1 TABLET ORAL EVERY 12 HOURS
Qty: 20 TAB | Refills: 0 | Status: SHIPPED | OUTPATIENT
Start: 2018-07-03 | End: 2018-07-13

## 2018-07-03 RX ORDER — AMOXICILLIN AND CLAVULANATE POTASSIUM 875; 125 MG/1; MG/1
1 TABLET, FILM COATED ORAL EVERY 12 HOURS
Status: DISCONTINUED | OUTPATIENT
Start: 2018-07-03 | End: 2018-07-03 | Stop reason: HOSPADM

## 2018-07-03 RX ORDER — AMOXICILLIN AND CLAVULANATE POTASSIUM 875; 125 MG/1; MG/1
1 TABLET, FILM COATED ORAL EVERY 12 HOURS
Qty: 20 TAB | Refills: 0 | Status: SHIPPED | OUTPATIENT
Start: 2018-07-03 | End: 2018-07-13

## 2018-07-03 RX ORDER — FERROUS SULFATE 325(65) MG
325 TABLET ORAL
Qty: 30 TAB | Refills: 0 | Status: SHIPPED | OUTPATIENT
Start: 2018-07-04 | End: 2023-07-13

## 2018-07-03 RX ADMIN — VERAPAMIL HYDROCHLORIDE 240 MG: 240 TABLET, FILM COATED, EXTENDED RELEASE ORAL at 07:45

## 2018-07-03 RX ADMIN — ASPIRIN 81 MG: 81 TABLET, CHEWABLE ORAL at 07:45

## 2018-07-03 RX ADMIN — AMPICILLIN SODIUM AND SULBACTAM SODIUM 3 G: 2; 1 INJECTION, POWDER, FOR SOLUTION INTRAMUSCULAR; INTRAVENOUS at 11:29

## 2018-07-03 RX ADMIN — AMPICILLIN SODIUM AND SULBACTAM SODIUM 3 G: 2; 1 INJECTION, POWDER, FOR SOLUTION INTRAMUSCULAR; INTRAVENOUS at 00:32

## 2018-07-03 RX ADMIN — SULFAMETHOXAZOLE AND TRIMETHOPRIM 1 TABLET: 800; 160 TABLET ORAL at 07:45

## 2018-07-03 RX ADMIN — OXYCODONE HYDROCHLORIDE AND ACETAMINOPHEN 500 MG: 500 TABLET ORAL at 07:45

## 2018-07-03 RX ADMIN — AMPICILLIN SODIUM AND SULBACTAM SODIUM 3 G: 2; 1 INJECTION, POWDER, FOR SOLUTION INTRAMUSCULAR; INTRAVENOUS at 05:07

## 2018-07-03 RX ADMIN — Medication 325 MG: at 07:45

## 2018-07-03 ASSESSMENT — ENCOUNTER SYMPTOMS
ABDOMINAL PAIN: 0
SPEECH CHANGE: 0
VOMITING: 0
MYALGIAS: 1
NAUSEA: 0
SHORTNESS OF BREATH: 0
COUGH: 0
FEVER: 0

## 2018-07-03 ASSESSMENT — PAIN SCALES - GENERAL
PAINLEVEL_OUTOF10: 0
PAINLEVEL_OUTOF10: 0
PAINLEVEL_OUTOF10: 1

## 2018-07-03 NOTE — DISCHARGE INSTRUCTIONS
* Follow up with MD Ta in 10-14 days   * Follow up with MD Mckeon in 1 week  * Call and set up a appointment with a Primary Care Physician as soon as possible [phone]  * Take prescriptions as prescribed  * Daily dressing changes to left hand. Remove old dressing, pack wound with Idoform Packing, place gauze on top, wrap with roll gauze and then with elastic ace wrap.  * Ok to shower, cover dressing and avoid getting wet      Discharge Instructions    Discharged to home by car with relative. Discharged via walking, hospital escort: Yes.  Special equipment needed: Not Applicable    Be sure to schedule a follow-up appointment with your primary care doctor or any specialists as instructed.     Discharge Plan:   Diet Plan: Discussed  Activity Level: Discussed  Confirmed Follow up Appointment: Patient to Call and Schedule Appointment  Confirmed Symptoms Management: Discussed  Medication Reconciliation Updated: Yes  Pneumococcal Vaccine Administered/Refused: Not given - Patient refused pneumococcal vaccine  Influenza Vaccine Indication: Patient Refuses    I understand that a diet low in cholesterol, fat, and sodium is recommended for good health. Unless I have been given specific instructions below for another diet, I accept this instruction as my diet prescription.   Other diet: regular    Special Instructions: None    · Is patient discharged on Warfarin / Coumadin?   No     Depression / Suicide Risk    As you are discharged from this [facility] facility, it is important to learn how to keep safe from harming yourself.    Recognize the warning signs:  · Abrupt changes in personality, positive or negative- including increase in energy   · Giving away possessions  · Change in eating patterns- significant weight changes-  positive or negative  · Change in sleeping patterns- unable to sleep or sleeping all the time   · Unwillingness or inability to communicate  · Depression  · Unusual sadness, discouragement and  loneliness  · Talk of wanting to die  · Neglect of personal appearance   · Rebelliousness- reckless behavior  · Withdrawal from people/activities they love  · Confusion- inability to concentrate     If you or a loved one observes any of these behaviors or has concerns about self-harm, here's what you can do:  · Talk about it- your feelings and reasons for harming yourself  · Remove any means that you might use to hurt yourself (examples: pills, rope, extension cords, firearm)  · Get professional help from the community (Mental Health, Substance Abuse, psychological counseling)  · Do not be alone:Call your Safe Contact- someone whom you trust who will be there for you.  · Call your local CRISIS HOTLINE 878-7332 or 636-543-5330  · Call your local Children's Mobile Crisis Response Team [place] (712) 285-1217 or www.[employer]  · Call the toll free National Suicide Prevention Hotlines   · National Suicide Prevention Lifeline 269-130-HSLY (4112)  · National Hope Line Network 800-SUICIDE (115-6968)      Incision and Drainage, Care After  Refer to this sheet in the next few weeks. These instructions provide you with information on caring for yourself after your procedure. Your caregiver may also give you more specific instructions. Your treatment has been planned according to current medical practices, but problems sometimes occur. Call your caregiver if you have any problems or questions after your procedure.  HOME CARE INSTRUCTIONS   · If antibiotic medicine is given, take it as directed. Finish it even if you start to feel better.  · Only take over-the-counter or prescription medicines for pain, discomfort, or fever as directed by your caregiver.  · Keep all follow-up appointments as directed by your caregiver.  · Change any bandages (dressings) as directed by your caregiver. Replace old dressings with clean dressings.  · Wash your hands before and after caring for your wound.  You will receive specific  instructions for cleansing and caring for your wound.   SEEK MEDICAL CARE IF:   · You have increased pain, swelling, or redness around the wound.  · You have increased drainage, smell, or bleeding from the wound.  · You have muscle aches, chills, or you feel generally sick.  · You have a fever.  MAKE SURE YOU:   · Understand these instructions.  · Will watch your condition.  · Will get help right away if you are not doing well or get worse.     This information is not intended to replace advice given to you by your health care provider. Make sure you discuss any questions you have with your health care provider.     Document Released: 03/11/2013 Document Revised: 01/08/2016 Document Reviewed: 03/11/2013  [employer] Interactive Patient Education ©2016 [employer]

## 2018-07-03 NOTE — PROGRESS NOTES
Infectious Disease Progress Note    Author: Shirlene Mckeon M.D. Date & Time of service: 7/3/2018  11:58 AM    Chief Complaint:    Left hand infection  Interval History:  2018-T-max 97.8 WBC 8.2 platelets 237 creatinine 0.94 denies any new issues  Labs Reviewed, Medications Reviewed, Radiology Reviewed and Wound Reviewed.    Review of Systems:  Review of Systems   Constitutional: Positive for malaise/fatigue. Negative for fever.   HENT: Negative for hearing loss.    Respiratory: Negative for cough and shortness of breath.    Cardiovascular: Negative for chest pain.   Gastrointestinal: Negative for abdominal pain, nausea and vomiting.   Genitourinary: Negative for dysuria.   Musculoskeletal: Positive for myalgias.        Left hand hurts minimally   Neurological: Negative for speech change.       Hemodynamics:  Temp (24hrs), Av.6 °C (97.8 °F), Min:36.4 °C (97.6 °F), Max:36.7 °C (98 °F)  Temperature: 36.5 °C (97.7 °F)  Pulse  Av.3  Min: 66  Max: 101   Blood Pressure : 153/70       Physical Exam:  Physical Exam   Constitutional: He is oriented to person, place, and time. No distress.   Nontoxic   Eyes: No scleral icterus.   Neck: Neck supple.   Cardiovascular:   Irregularly irregular   Pulmonary/Chest: He has no wheezes. He has no rales.   Abdominal: Soft. There is no tenderness. There is no rebound.   Musculoskeletal: He exhibits no edema.   Left hand wound on the thenar eminence clean and packed plus sutures present.   Neurological: He is alert and oriented to person, place, and time.   Skin: No rash noted. No erythema.   Vitals reviewed.      Meds:    Current Facility-Administered Medications:   •  ferrous sulfate  •  ascorbic acid  •  sulfamethoxazole-trimethoprim  •  aspirin  •  NS  •  ondansetron  •  ondansetron  •  senna-docusate **AND** polyethylene glycol/lytes **AND** magnesium hydroxide **AND** bisacodyl  •  acetaminophen  •  Respiratory Care per Protocol  •  ampicillin-sulbactam (UNASYN) IV  •   rivaroxaban  •  aspirin  •  verapamil ER  •  atorvastatin    Labs:  Recent Labs      07/01/18   0234  07/02/18   0554  07/03/18   0108   WBC  7.2  8.2  8.3   RBC  3.29*  3.52*  3.59*   HEMOGLOBIN  10.4*  11.2*  11.5*   HEMATOCRIT  31.3*  33.9*  33.6*   MCV  95.1  96.3  93.6   MCH  31.6  31.8  32.0   RDW  42.3  42.6  41.6   PLATELETCT  214  237  242   MPV  9.3  9.2  9.2   NEUTSPOLYS  80.40*  74.10*  74.70*   LYMPHOCYTES  10.50*  10.30*  10.00*   MONOCYTES  7.90  10.90  11.90   EOSINOPHILS  0.70  3.80  2.40   BASOPHILS  0.10  0.50  0.40     Recent Labs      07/01/18   0234  07/02/18   0554   SODIUM  138  138   POTASSIUM  4.3  4.0   CHLORIDE  108  107   CO2  24  24   GLUCOSE  131*  108*   BUN  21  17     Recent Labs      07/01/18   0234  07/02/18   0554   ALBUMIN   --   2.9*   CREATININE  1.32  0.94       Imaging:  No results found.    Micro:  Results     Procedure Component Value Units Date/Time    CULTURE TISSUE W/ GRM STAIN [[id number]] Collected:  06/30/18 0213    Order Status:  Completed Specimen:  Tissue Updated:  07/02/18 1308     Gram Stain Result Few WBCs.  No organisms seen.       Significant Indicator NEG     Source TISS     Site Left hand #2     Tissue Culture Rare growth mixed skin molina.    ANAEROBIC CULTURE [[id number]] Collected:  06/30/18 0213    Order Status:  Completed Specimen:  Tissue Updated:  07/02/18 1308     Significant Indicator NEG     Source TISS     Site Left hand #2     Anaerobic Culture, Culture Res Culture in progress.    CULTURE TISSUE W/ GRM STAIN [[id number]]  (Abnormal) Collected:  06/30/18 0212    Order Status:  Completed Specimen:  Tissue Updated:  07/02/18 1307     Gram Stain Result Moderate WBCs.  Rare Gram positive cocci.   (A)     Significant Indicator NEG     Source TISS     Site Left hand #1     Tissue Culture Light growth mixed skin molina.    ANAEROBIC CULTURE [[id number]] Collected:  06/30/18 0212    Order Status:  Completed Specimen:  Tissue Updated:  07/02/18 1307      "Significant Indicator NEG     Source TISS     Site Left hand #1     Anaerobic Culture, Culture Res Culture in progress.    BLOOD CULTURE [[id number]] Collected:  07/01/18 0950    Order Status:  Completed Specimen:  Blood from Peripheral Updated:  07/02/18 0622     Significant Indicator NEG     Source BLD     Site PERIPHERAL     Blood Culture No Growth    Note: Blood cultures are incubated for 5 days and  are monitored continuously.Positive blood cultures  are called to the RN and reported as soon as  they are identified.      Narrative:       Per Hospital Policy: Only change Specimen Src: to \"Line\" if  specified by physician order.    BLOOD CULTURE [[id number]] Collected:  07/01/18 1001    Order Status:  Completed Specimen:  Blood from Peripheral Updated:  07/02/18 0622     Significant Indicator NEG     Source BLD     Site PERIPHERAL     Blood Culture No Growth    Note: Blood cultures are incubated for 5 days and  are monitored continuously.Positive blood cultures  are called to the RN and reported as soon as  they are identified.      Narrative:       Per Hospital Policy: Only change Specimen Src: to \"Line\" if  specified by physician order.    GRAM STAIN [[id number]] Collected:  06/30/18 0212    Order Status:  Completed Specimen:  Tissue Updated:  06/30/18 2219     Significant Indicator .     Source TISS     Site Left hand #1     Gram Stain Result Moderate WBCs.  Rare Gram positive cocci.      GRAM STAIN [[id number]] Collected:  06/30/18 0213    Order Status:  Completed Specimen:  Tissue Updated:  06/30/18 2216     Significant Indicator .     Source TISS     Site Left hand #2     Gram Stain Result Few WBCs.  No organisms seen.            Assessment:  Active Hospital Problems    Diagnosis   • *Cellulitis and abscess of hand [L03.119, L02.519]   • Hyperglycemia [R73.9]   • Iron deficiency anemia [D50.9]   • Atrial fibrillation (HCC) [I48.91]   • CAD (coronary artery disease) [I25.10]   • Hypercholesteremia [E78.00] "       Plan:  Abscess of the left hand  Postop infection after his trigger finger release  Underwent surgery on 6/30/2018 and significant purulence was encountered  OR cultures remain negative so far  Patient was on antibiotics prior to his surgery  I spoke to the lab at [facility]  Those cultures are negative as well-referred to the media tab  Will change him to Augmentin and Bactrim  We will follow him as an outpatient  Aim for at least 2 weeks of antibiotics    Discussed with internal medicine.

## 2018-07-03 NOTE — DISCHARGE SUMMARY
"Discharge Summary    CHIEF COMPLAINT ON ADMISSION  Chief Complaint   Patient presents with   • Wound Check     \"I had finger surgery June 4th and today my doctor sent me here because they said it was very infected and swollen. They said they want me to see an infectious disease MD.\" Pt moving all fingers, denies pain.    • Sent by MD       Reason for Admission  Hand Infection      Admission Date  6/30/2018    CODE STATUS  Full Code    HPI & HOSPITAL COURSE  This is a 83 y.o. male here with left 3rd finger infection.    H/o afib on AC, had a left 3rd finger trigger finger surgery 3 weeks ago at the [facility] in [place] where he lives.  He stated he had no problems of purulent dc until after his sutures were removed 1 week ago.  He was sent to [facility] due to air seen on CT hand from [facility].  He was started on unasyn and vanc. Dr. Ta with ortho was consulted and patient underwent I&D 6/30. Cultures taken had no growth. Dr. Mckeon with ID was consulted and patient was transitioned to augmentin and bactrim for 2 weeks. Patient was discharged with daily iodoform packing changes and f/u with Dr. Ta in 2 weeks.  He was found with iron deficiency anemia and started on oral iron.     Therefore, he is discharged in good and stable condition to home with close outpatient follow-up.    The patient met 2-midnight criteria for an inpatient stay at the time of discharge.    Discharge Date  7/3/18    FOLLOW UP ITEMS POST DISCHARGE  Ortho - post-op  F/u anemia    DISCHARGE DIAGNOSES  Principal Problem:    Cellulitis and abscess of hand POA: Yes  Active Problems:    Hypercholesteremia POA: Yes    Atrial fibrillation (HCC) POA: Yes    CAD (coronary artery disease) POA: Yes    Hyperglycemia POA: Yes    Iron deficiency anemia POA: Yes  Resolved Problems:    * No resolved hospital problems. *      FOLLOW UP  No future appointments.  Tien Ta M.D.  [address]  [facility] [address]  [phone]    In 2 weeks  For suture " removal, For wound re-check    Shirlene Mckeon M.D.  [address] [facility]  [address]  [facility] [address] [phone]    In 1 week  For wound re-check      MEDICATIONS ON DISCHARGE     Medication List      START taking these medications      Instructions   amoxicillin-clavulanate 875-125 MG Tabs  Commonly known as:  AUGMENTIN   Take 1 Tab by mouth every 12 hours for 10 days.  Dose:  1 Tab     ferrous sulfate 325 (65 Fe) MG tablet  Start taking on:  7/4/2018   Take 1 Tab by mouth every morning with breakfast.  Dose:  325 mg     sulfamethoxazole-trimethoprim 800-160 MG tablet  Commonly known as:  BACTRIM DS   Take 1 Tab by mouth every 12 hours for 10 days.  Dose:  1 Tab        CONTINUE taking these medications      Instructions   ascorbic acid 500 MG Tabs  Commonly known as:  ascorbic acid   Take 500 mg by mouth every day.  Dose:  500 mg     aspirin 81 MG Chew chewable tablet  Commonly known as:  ASA   Take 81 mg by mouth every day.  Dose:  81 mg     atorvastatin 20 MG Tabs  Commonly known as:  LIPITOR   Take 40 mg by mouth every evening.  Dose:  40 mg     rivaroxaban 20 MG Tabs tablet  Commonly known as:  XARELTO   Take 20 mg by mouth with dinner.  Dose:  20 mg     therapeutic multivitamin-minerals Tabs   Take 1 Tab by mouth every day.  Dose:  1 Tab     verapamil  MG Tbcr  Commonly known as:  CALAN-SR   Take 240 mg by mouth every day.  Dose:  240 mg        ASK your doctor about these medications      Instructions   Meloxicam 10 MG Caps   Take 20 mg by mouth every day.  Dose:  20 mg            Allergies  No Known Allergies    DIET  Orders Placed This Encounter   Procedures   • Diet Order Regular     Standing Status:   Standing     Number of Occurrences:   1     Order Specific Question:   Diet:     Answer:   Regular [1]       ACTIVITY  As tolerated.  Weight bearing as tolerated    CONSULTATIONS  Ortho   ID    PROCEDURES  I&D    LABORATORY  Lab Results   Component Value Date    SODIUM 138 07/02/2018    POTASSIUM 4.0  07/02/2018    CHLORIDE 107 07/02/2018    CO2 24 07/02/2018    GLUCOSE 108 (H) 07/02/2018    BUN 17 07/02/2018    CREATININE 0.94 07/02/2018        Lab Results   Component Value Date    WBC 8.3 07/03/2018    HEMOGLOBIN 11.5 (L) 07/03/2018    HEMATOCRIT 33.6 (L) 07/03/2018    PLATELETCT 242 07/03/2018        Total time of the discharge process exceeds 35 minutes.

## 2018-07-03 NOTE — PROGRESS NOTES
Infectious Disease Progress Note    Author: Shirlene Mckeon M.D. Date & Time of service: 2018  5:55 PM    Chief Complaint:    Left hand infection  Interval History:  2018-T-max 97.8 WBC 8.2 platelets 237 creatinine 0.94 denies any new issues  Labs Reviewed, Medications Reviewed, Radiology Reviewed and Wound Reviewed.    Review of Systems:  Review of Systems   Constitutional: Positive for malaise/fatigue. Negative for fever.   HENT: Negative for hearing loss.    Respiratory: Negative for cough and shortness of breath.    Cardiovascular: Negative for chest pain.   Gastrointestinal: Negative for abdominal pain, nausea and vomiting.   Genitourinary: Negative for dysuria.   Musculoskeletal: Positive for myalgias.        Left hand hurts minimally   Neurological: Negative for speech change.       Hemodynamics:  Temp (24hrs), Av.3 °C (97.4 °F), Min:36.1 °C (97 °F), Max:36.6 °C (97.8 °F)  Temperature: 36.4 °C (97.6 °F)  Pulse  Av.6  Min: 66  Max: 101   Blood Pressure : 150/63       Physical Exam:  Physical Exam   Constitutional: He is oriented to person, place, and time. No distress.   Nontoxic   Eyes: No scleral icterus.   Neck: Neck supple.   Cardiovascular:   Irregularly irregular   Pulmonary/Chest: He has no wheezes. He has no rales.   Abdominal: Soft. There is no tenderness. There is no rebound.   Musculoskeletal: He exhibits no edema.   Left hand bandaged   Neurological: He is alert and oriented to person, place, and time.   Skin: No rash noted. No erythema.   Vitals reviewed.      Meds:    Current Facility-Administered Medications:   •  vancomycin  •  ferrous sulfate  •  ascorbic acid  •  aspirin  •  NS  •  ondansetron  •  ondansetron  •  senna-docusate **AND** polyethylene glycol/lytes **AND** magnesium hydroxide **AND** bisacodyl  •  acetaminophen  •  Respiratory Care per Protocol  •  MD ALERT... vancomycin  •  ampicillin-sulbactam (UNASYN) IV  •  rivaroxaban  •  aspirin  •  verapamil ER  •   atorvastatin    Labs:  Recent Labs      06/30/18   0121  07/01/18   0234  07/02/18   0554   WBC  5.7  7.2  8.2   RBC  3.76*  3.29*  3.52*   HEMOGLOBIN  11.8*  10.4*  11.2*   HEMATOCRIT  35.8*  31.3*  33.9*   MCV  95.2  95.1  96.3   MCH  31.4  31.6  31.8   RDW  42.9  42.3  42.6   PLATELETCT  228  214  237   MPV  9.1  9.3  9.2   NEUTSPOLYS  64.30  80.40*  74.10*   LYMPHOCYTES  22.00  10.50*  10.30*   MONOCYTES  9.30  7.90  10.90   EOSINOPHILS  3.50  0.70  3.80   BASOPHILS  0.70  0.10  0.50     Recent Labs      06/30/18   0121  07/01/18   0234  07/02/18   0554   SODIUM  138  138  138   POTASSIUM  4.0  4.3  4.0   CHLORIDE  107  108  107   CO2  21  24  24   GLUCOSE  110*  131*  108*   BUN  20  21  17     Recent Labs      06/30/18   0121  07/01/18   0234  07/02/18   0554   ALBUMIN   --    --   2.9*   CREATININE  1.21  1.32  0.94       Imaging:  No results found.    Micro:  Results     Procedure Component Value Units Date/Time    CULTURE TISSUE W/ GRM STAIN [[id number]] Collected:  06/30/18 0213    Order Status:  Completed Specimen:  Tissue Updated:  07/02/18 1308     Gram Stain Result Few WBCs.  No organisms seen.       Significant Indicator NEG     Source TISS     Site Left hand #2     Tissue Culture Rare growth mixed skin molina.    ANAEROBIC CULTURE [[id number]] Collected:  06/30/18 0213    Order Status:  Completed Specimen:  Tissue Updated:  07/02/18 1308     Significant Indicator NEG     Source TISS     Site Left hand #2     Anaerobic Culture, Culture Res Culture in progress.    CULTURE TISSUE W/ GRM STAIN [[id number]]  (Abnormal) Collected:  06/30/18 0212    Order Status:  Completed Specimen:  Tissue Updated:  07/02/18 1307     Gram Stain Result Moderate WBCs.  Rare Gram positive cocci.   (A)     Significant Indicator NEG     Source TISS     Site Left hand #1     Tissue Culture Light growth mixed skin molina.    ANAEROBIC CULTURE [[id number]] Collected:  06/30/18 0212    Order Status:  Completed Specimen:  Tissue Updated:  " 07/02/18 1307     Significant Indicator NEG     Source TISS     Site Left hand #1     Anaerobic Culture, Culture Res Culture in progress.    BLOOD CULTURE [[id number]] Collected:  07/01/18 0950    Order Status:  Completed Specimen:  Blood from Peripheral Updated:  07/02/18 0622     Significant Indicator NEG     Source BLD     Site PERIPHERAL     Blood Culture No Growth    Note: Blood cultures are incubated for 5 days and  are monitored continuously.Positive blood cultures  are called to the RN and reported as soon as  they are identified.      Narrative:       Per Hospital Policy: Only change Specimen Src: to \"Line\" if  specified by physician order.    BLOOD CULTURE [[id number]] Collected:  07/01/18 1001    Order Status:  Completed Specimen:  Blood from Peripheral Updated:  07/02/18 0622     Significant Indicator NEG     Source BLD     Site PERIPHERAL     Blood Culture No Growth    Note: Blood cultures are incubated for 5 days and  are monitored continuously.Positive blood cultures  are called to the RN and reported as soon as  they are identified.      Narrative:       Per Hospital Policy: Only change Specimen Src: to \"Line\" if  specified by physician order.    GRAM STAIN [[id number]] Collected:  06/30/18 0212    Order Status:  Completed Specimen:  Tissue Updated:  06/30/18 2219     Significant Indicator .     Source TISS     Site Left hand #1     Gram Stain Result Moderate WBCs.  Rare Gram positive cocci.      GRAM STAIN [[id number]] Collected:  06/30/18 0213    Order Status:  Completed Specimen:  Tissue Updated:  06/30/18 2216     Significant Indicator .     Source TISS     Site Left hand #2     Gram Stain Result Few WBCs.  No organisms seen.            Assessment:  Active Hospital Problems    Diagnosis   • *Cellulitis and abscess of hand [L03.119, L02.519]   • Hyperglycemia [R73.9]   • Iron deficiency anemia [D50.9]   • Atrial fibrillation (HCC) [I48.91]   • CAD (coronary artery disease) [I25.10]   • " Hypercholesteremia [E78.00]       Plan:  Abscess of the left hand  Postop infection after his trigger finger release  Underwent surgery on 6/30/2018 and significant purulence was encountered  OR cultures remain negative so far  Change vancomycin to Bactrim  Will change Unasyn to oral antibiotics soon  Continue with the wound care    Discussed with internal medicine.

## 2018-07-03 NOTE — CARE PLAN
Problem: Infection  Goal: Will remain free from infection    Intervention: Implement standard precautions and perform hand washing before and after patient contact  Foam in/foam out      Problem: Bowel/Gastric:  Goal: Will not experience complications related to bowel motility    Intervention: Assess baseline bowel pattern  Pt states he is at his normal bowel pattern. Having BM once a day

## 2018-07-03 NOTE — CARE PLAN
Problem: Infection  Goal: Will remain free from infection  Dressing changes to left hand daily. Dressing in CDI. ABX administered per MAR. Pt remains afebrile. Labs reviewed. Standard precautions in place.     Problem: Pain Management  Goal: Pain level will decrease to patient's comfort goal  Pt denies pain/ need for pain medication. Continue to assess throughout shift.

## 2018-07-03 NOTE — PROGRESS NOTES
Pt discharged per active order. Instructions reviewed with pt, signed copy in chart. PIVs removed, tips intact. All questions addressed

## 2018-07-06 LAB
BACTERIA BLD CULT: NORMAL
BACTERIA BLD CULT: NORMAL
SIGNIFICANT IND 70042: NORMAL
SIGNIFICANT IND 70042: NORMAL
SITE SITE: NORMAL
SITE SITE: NORMAL
SOURCE SOURCE: NORMAL
SOURCE SOURCE: NORMAL

## 2018-07-10 ENCOUNTER — OFFICE VISIT (OUTPATIENT)
Dept: INFECTIOUS DISEASES | Facility: MEDICAL CENTER | Age: 83
End: 2018-07-10
Payer: MEDICARE

## 2018-07-10 VITALS
TEMPERATURE: 97.8 F | BODY MASS INDEX: 24.78 KG/M2 | SYSTOLIC BLOOD PRESSURE: 130 MMHG | DIASTOLIC BLOOD PRESSURE: 60 MMHG | WEIGHT: 187 LBS | HEART RATE: 107 BPM | HEIGHT: 73 IN | OXYGEN SATURATION: 96 %

## 2018-07-10 DIAGNOSIS — L02.519 CELLULITIS AND ABSCESS OF HAND: ICD-10-CM

## 2018-07-10 DIAGNOSIS — L03.119 CELLULITIS AND ABSCESS OF HAND: ICD-10-CM

## 2018-07-10 PROCEDURE — 99214 OFFICE O/P EST MOD 30 MIN: CPT | Performed by: INTERNAL MEDICINE

## 2018-07-10 NOTE — PROGRESS NOTES
Chief Complaint   Patient presents with   • Hospital Follow-up     Left hand infection       HISTORY OF PRESENT ILLNESS: Patient is a 83 y.o. male established patient who presents today for fu hand infection  Last seen 7/3 for  Cellulitis and abscess of the left hand-Postop infection after his trigger finger release  s/p I and D 6/30/2018 and significant purulence was encountered  OR cultures remain negative - on antibiotics prior to his surgery  [facility] cxs neg  Discharged on Augmentin and Bactrim-tolerating well. Denies SE  Wife assisting with wound care-packing daily-getting smaller. 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    Current Outpatient Prescriptions   Medication Sig Dispense Refill   • ferrous sulfate 325 (65 Fe) MG tablet Take 1 Tab by mouth every morning with breakfast. 30 Tab 0   • sulfamethoxazole-trimethoprim (BACTRIM DS) 800-160 MG tablet Take 1 Tab by mouth every 12 hours for 10 days. 20 Tab 0   • amoxicillin-clavulanate (AUGMENTIN) 875-125 MG Tab Take 1 Tab by mouth every 12 hours for 10 days. 20 Tab 0   • verapamil ER (CALAN-SR) 240 MG Tab CR Take 240 mg by mouth every day.     • Meloxicam 10 MG Cap Take 20 mg by mouth every day.     • atorvastatin (LIPITOR) 20 MG Tab Take 40 mg by mouth every evening.     • aspirin (ASA) 81 MG Chew Tab chewable tablet Take 81 mg by mouth every day.     • rivaroxaban (XARELTO) 20 MG Tab tablet Take 20 mg by mouth with dinner.     • ascorbic acid (ASCORBIC ACID) 500 MG TABS Take 500 mg by  "mouth every day.     • therapeutic multivitamin-minerals (THERAGRAN-M) TABS Take 1 Tab by mouth every day.       No current facility-administered medications for this visit.        Social History   Substance Use Topics   • Smoking status: Never Smoker   • Smokeless tobacco: Never Used   • Alcohol use Yes       Family History   Problem Relation Age of Onset   • Heart Disease Mother    • Heart Disease Father        ROS:  Review of Systems   Constitutional: Negative for fever, chills, weight loss and malaise/fatigue.   HENT: Negative for ear pain, nosebleeds, congestion, sore throat and neck pain.    Eyes: Negative for blurred vision.   Respiratory: Negative for cough, sputum production, shortness of breath and wheezing.    Cardiovascular: Negative for chest pain, palpitations, orthopnea and leg swelling.   Gastrointestinal: Negative for heartburn, nausea, vomiting and abdominal pain.   Genitourinary: Negative for dysuria, urgency and frequency.   Musculoskeletal: Negative for myalgias, back pain and joint pain.   Skin: Negative for rash and itching.   Neurological: Negative for dizziness, tingling, tremors, sensory change, focal weakness and headaches.   Endo/Heme/Allergies: Does not bruise/bleed easily.   Psychiatric/Behavioral: Negative for depression, suicidal ideas and memory loss.  The patient is not nervous/anxious and does not have insomnia.    All other systems reviewed and are negative except as in HPI.      Exam:  Blood pressure 130/60, pulse (!) 107, temperature 36.6 °C (97.8 °F), height 1.854 m (6' 1\"), weight 84.8 kg (187 lb), SpO2 96 %.  General:  Well nourished, well developed male in NAD. Pleasant and cooperative  Head: NCAT, Pupils are equal round reactive to light. Extraocular movements intact. Oropharynx is clear.conjuctival bleeding, small right eye  Neck: Supple.  No LAD  Pulmonary: Clear to ausculation.  No rales, rhonchi, or wheezing. Unlabored  Cardiovascular: IRR  Abdominal: Soft, nontender, " nondistended  Skin: No rashes.  Extremities: no clubbing, cyanosis, or edema. Left hand surgical site well-approx sutures intact  Small area less than 5 mm packed No erythema or edema  Neurologic: Alert and oriented x4. Speech is fluent without dysarthria. Cranial nerves intact. Moving all extremities. Gait within normal limits.  Lab Results   Component Value Date/Time    WBC 8.3 07/03/2018 01:08 AM    RBC 3.59 (L) 07/03/2018 01:08 AM    HEMOGLOBIN 11.5 (L) 07/03/2018 01:08 AM    HEMATOCRIT 33.6 (L) 07/03/2018 01:08 AM    MCV 93.6 07/03/2018 01:08 AM    MCH 32.0 07/03/2018 01:08 AM    MCHC 34.2 07/03/2018 01:08 AM    MPV 9.2 07/03/2018 01:08 AM    NEUTSPOLYS 74.70 (H) 07/03/2018 01:08 AM    LYMPHOCYTES 10.00 (L) 07/03/2018 01:08 AM    MONOCYTES 11.90 07/03/2018 01:08 AM    EOSINOPHILS 2.40 07/03/2018 01:08 AM    BASOPHILS 0.40 07/03/2018 01:08 AM      Lab Results   Component Value Date/Time    SODIUM 138 07/02/2018 05:54 AM    POTASSIUM 4.0 07/02/2018 05:54 AM    CHLORIDE 107 07/02/2018 05:54 AM    CO2 24 07/02/2018 05:54 AM    GLUCOSE 108 (H) 07/02/2018 05:54 AM    BUN 17 07/02/2018 05:54 AM    CREATININE 0.94 07/02/2018 05:54 AM        Assessment/Plan:  1. Cellulitis and abscess of hand      Cellulitis resolved. Healing well-complete abx as scheduled. Continue wound care-sutures out next week. S/sxs infection discussed        Nano Kline M.D.

## 2019-06-07 ENCOUNTER — HOSPITAL ENCOUNTER (OUTPATIENT)
Facility: MEDICAL CENTER | Age: 84
End: 2019-06-07
Attending: FAMILY MEDICINE
Payer: MEDICARE

## 2019-06-07 ENCOUNTER — OFFICE VISIT (OUTPATIENT)
Dept: URGENT CARE | Facility: PHYSICIAN GROUP | Age: 84
End: 2019-06-07
Payer: MEDICARE

## 2019-06-07 VITALS
RESPIRATION RATE: 16 BRPM | TEMPERATURE: 97.9 F | DIASTOLIC BLOOD PRESSURE: 100 MMHG | WEIGHT: 189 LBS | HEART RATE: 105 BPM | SYSTOLIC BLOOD PRESSURE: 136 MMHG | BODY MASS INDEX: 25.05 KG/M2 | HEIGHT: 73 IN | OXYGEN SATURATION: 96 %

## 2019-06-07 DIAGNOSIS — R31.9 HEMATURIA, UNSPECIFIED TYPE: ICD-10-CM

## 2019-06-07 LAB
APPEARANCE UR: CLEAR
BILIRUB UR STRIP-MCNC: NEGATIVE MG/DL
COLOR UR AUTO: YELLOW
GLUCOSE UR STRIP.AUTO-MCNC: NEGATIVE MG/DL
KETONES UR STRIP.AUTO-MCNC: NEGATIVE MG/DL
LEUKOCYTE ESTERASE UR QL STRIP.AUTO: NEGATIVE
NITRITE UR QL STRIP.AUTO: NEGATIVE
PH UR STRIP.AUTO: 7 [PH] (ref 5–8)
PROT UR QL STRIP: NORMAL MG/DL
RBC UR QL AUTO: NORMAL
SP GR UR STRIP.AUTO: 1.02
UROBILINOGEN UR STRIP-MCNC: 0.2 MG/DL

## 2019-06-07 PROCEDURE — 99203 OFFICE O/P NEW LOW 30 MIN: CPT | Performed by: FAMILY MEDICINE

## 2019-06-07 PROCEDURE — 87086 URINE CULTURE/COLONY COUNT: CPT

## 2019-06-07 PROCEDURE — 81002 URINALYSIS NONAUTO W/O SCOPE: CPT | Performed by: FAMILY MEDICINE

## 2019-06-07 ASSESSMENT — ENCOUNTER SYMPTOMS
BRUISES/BLEEDS EASILY: 0
ABDOMINAL PAIN: 0
CHILLS: 0
FOCAL WEAKNESS: 0
SENSORY CHANGE: 0
NAUSEA: 0
EYE REDNESS: 0
VOMITING: 0
EYE DISCHARGE: 0
MYALGIAS: 0

## 2019-06-07 NOTE — PROGRESS NOTES
"Subjective:      Miguel Reyna is a 84 y.o. male who presents with Urinary Frequency (feels like he has to go , but cant x a few days )            Onset yesterday hematuria.  He sees blood at the beginning of the stream and then it appears to clear up.  He occas sees small amount of blood after self cath chronically but has not needed to cath in several weeks. He had a ureter/prostate dilation 2 months ago. +PMH prostate cancer. No fever. No flank pain.         Review of Systems   Constitutional: Negative for chills.   Eyes: Negative for discharge and redness.   Gastrointestinal: Negative for abdominal pain, nausea and vomiting.   Musculoskeletal: Negative for joint pain and myalgias.   Skin: Negative for itching and rash.   Neurological: Negative for sensory change and focal weakness.   Endo/Heme/Allergies: Does not bruise/bleed easily.     .  Medications, Allergies, and current problem list reviewed today in Epic       Objective:     /100   Pulse (!) 105   Temp 36.6 °C (97.9 °F) (Temporal)   Resp 16   Ht 1.854 m (6' 1\")   Wt 85.7 kg (189 lb)   SpO2 96%   BMI 24.94 kg/m²      Physical Exam   Constitutional: He is oriented to person, place, and time. He appears well-developed and well-nourished. No distress.   HENT:   Head: Normocephalic and atraumatic.   Eyes: Conjunctivae are normal.   Genitourinary:   Genitourinary Comments: No CVA tenderness.  No suprapubic tenderness.   Neurological: He is alert and oriented to person, place, and time. He exhibits normal muscle tone.   Skin: Skin is warm and dry. No rash noted.   Psychiatric: He has a normal mood and affect. His behavior is normal.               Assessment/Plan:     UA reviewed.  Large blood.  No nitrite or leukocyte esterase.    1. Hematuria, unspecified type  URINE CULTURE(NEW)    REFERRAL TO UROLOGY     Differential diagnosis, natural history, supportive care, and indications for immediate follow-up discussed at length.     He does have a " urologist in [place] however will be here for the next 2 to 3 months.  Referral for urologist here placed.  I will go ahead and culture his urine however I do not suspect infection.  If significant bleeding persists he is to go to the emergency room.

## 2019-06-10 LAB
BACTERIA UR CULT: NORMAL
SIGNIFICANT IND 70042: NORMAL
SITE SITE: NORMAL
SOURCE SOURCE: NORMAL

## 2020-04-05 ENCOUNTER — OFFICE VISIT (OUTPATIENT)
Dept: URGENT CARE | Facility: PHYSICIAN GROUP | Age: 85
End: 2020-04-05
Payer: MEDICARE

## 2020-04-05 ENCOUNTER — HOSPITAL ENCOUNTER (OUTPATIENT)
Facility: MEDICAL CENTER | Age: 85
End: 2020-04-05
Attending: NURSE PRACTITIONER
Payer: MEDICARE

## 2020-04-05 VITALS
OXYGEN SATURATION: 97 % | BODY MASS INDEX: 24.92 KG/M2 | SYSTOLIC BLOOD PRESSURE: 136 MMHG | HEIGHT: 73 IN | WEIGHT: 188 LBS | DIASTOLIC BLOOD PRESSURE: 60 MMHG | HEART RATE: 86 BPM | TEMPERATURE: 97.5 F | RESPIRATION RATE: 16 BRPM

## 2020-04-05 DIAGNOSIS — R31.9 URINARY TRACT INFECTION WITH HEMATURIA, SITE UNSPECIFIED: ICD-10-CM

## 2020-04-05 DIAGNOSIS — R39.15 URINARY URGENCY: ICD-10-CM

## 2020-04-05 DIAGNOSIS — N39.0 URINARY TRACT INFECTION WITH HEMATURIA, SITE UNSPECIFIED: ICD-10-CM

## 2020-04-05 LAB
APPEARANCE UR: NORMAL
BILIRUB UR STRIP-MCNC: NORMAL MG/DL
COLOR UR AUTO: NORMAL
GLUCOSE UR STRIP.AUTO-MCNC: NORMAL MG/DL
KETONES UR STRIP.AUTO-MCNC: NORMAL MG/DL
LEUKOCYTE ESTERASE UR QL STRIP.AUTO: NORMAL
NITRITE UR QL STRIP.AUTO: NORMAL
PH UR STRIP.AUTO: 5.5 [PH] (ref 5–8)
PROT UR QL STRIP: 100 MG/DL
RBC UR QL AUTO: NORMAL
SP GR UR STRIP.AUTO: 1.01
UROBILINOGEN UR STRIP-MCNC: NORMAL MG/DL

## 2020-04-05 PROCEDURE — 99000 SPECIMEN HANDLING OFFICE-LAB: CPT | Performed by: NURSE PRACTITIONER

## 2020-04-05 PROCEDURE — 87086 URINE CULTURE/COLONY COUNT: CPT

## 2020-04-05 PROCEDURE — 81002 URINALYSIS NONAUTO W/O SCOPE: CPT | Performed by: NURSE PRACTITIONER

## 2020-04-05 PROCEDURE — 99214 OFFICE O/P EST MOD 30 MIN: CPT | Performed by: NURSE PRACTITIONER

## 2020-04-05 RX ORDER — DILTIAZEM HYDROCHLORIDE 240 MG/1
240 CAPSULE, COATED, EXTENDED RELEASE ORAL DAILY
COMMUNITY
Start: 2019-08-26

## 2020-04-05 RX ORDER — CIPROFLOXACIN 500 MG/1
500 TABLET, FILM COATED ORAL EVERY 12 HOURS
Qty: 14 TAB | Refills: 0 | Status: SHIPPED | OUTPATIENT
Start: 2020-04-05 | End: 2020-04-12

## 2020-04-05 ASSESSMENT — ENCOUNTER SYMPTOMS: FEVER: 0

## 2020-04-05 NOTE — PROGRESS NOTES
Subjective:      Miguel Reyna is a 85 y.o. male who presents with UTI (x couple of day, frequency, blood in urine )            Urinary Frequency   This is a new problem. Episode onset: pt reports new onset of urinary urgency and frequency that started 2-3 days ago. Reports frequent urination at night. No fevers. Hx of benign hematuria from prostate cx several years ago post radiation tx. The problem occurs intermittently. The problem has been unchanged. Associated symptoms include urinary symptoms. Pertinent negatives include no fever. He has tried nothing for the symptoms.       Review of Systems   Constitutional: Negative for fever.   Genitourinary: Positive for frequency, hematuria and urgency.   All other systems reviewed and are negative.    Past Medical History:   Diagnosis Date   • Cancer (HCC)    • Myocardial infarct (HCC) 9/24/2011   • Prostate CA (HCC) 01/01/2018      Past Surgical History:   Procedure Laterality Date   • IRRIGATION & DEBRIDEMENT ORTHO Left 6/30/2018    Procedure: IRRIGATION & DEBRIDEMENT HAND;  Surgeon: Tien Ta M.D.;  Location: SURGERY [facility];  Service: Orthopedics   • TURP-VAPOR  10/01/2012   • OTHER ORTHOPEDIC SURGERY  1988    Shoulder bone spur      Social History     Socioeconomic History   • Marital status:      Spouse name: Not on file   • Number of children: Not on file   • Years of education: Not on file   • Highest education level: Not on file   Occupational History   • Not on file   Social Needs   • Financial resource strain: Not on file   • Food insecurity     Worry: Not on file     Inability: Not on file   • Transportation needs     Medical: Not on file     Non-medical: Not on file   Tobacco Use   • Smoking status: Never Smoker   • Smokeless tobacco: Never Used   Substance and Sexual Activity   • Alcohol use: Yes   • Drug use: No   • Sexual activity: Not on file   Lifestyle   • Physical activity     Days per week: Not on file     Minutes per session: Not  "on file   • Stress: Not on file   Relationships   • Social connections     Talks on phone: Not on file     Gets together: Not on file     Attends Alevism service: Not on file     Active member of club or organization: Not on file     Attends meetings of clubs or organizations: Not on file     Relationship status: Not on file   • Intimate partner violence     Fear of current or ex partner: Not on file     Emotionally abused: Not on file     Physically abused: Not on file     Forced sexual activity: Not on file   Other Topics Concern   • Not on file   Social History Narrative   • Not on file          Objective:     /60 (BP Location: Right arm, Patient Position: Sitting, BP Cuff Size: Adult)   Pulse 86   Temp 36.4 °C (97.5 °F) (Tympanic)   Resp 16   Ht 1.854 m (6' 1\")   Wt 85.3 kg (188 lb)   SpO2 97%   BMI 24.80 kg/m²      Physical Exam  Vitals signs and nursing note reviewed.   Constitutional:       Appearance: Normal appearance. He is normal weight.   HENT:      Head: Normocephalic and atraumatic.      Nose: Nose normal.      Mouth/Throat:      Mouth: Mucous membranes are moist.   Eyes:      Extraocular Movements: Extraocular movements intact.      Pupils: Pupils are equal, round, and reactive to light.   Neck:      Musculoskeletal: Normal range of motion.   Cardiovascular:      Rate and Rhythm: Normal rate and regular rhythm.   Pulmonary:      Effort: Pulmonary effort is normal.   Abdominal:      Palpations: Abdomen is soft.      Tenderness: There is no abdominal tenderness. There is no right CVA tenderness or left CVA tenderness.   Musculoskeletal: Normal range of motion.   Skin:     General: Skin is warm and dry.      Capillary Refill: Capillary refill takes less than 2 seconds.   Neurological:      General: No focal deficit present.      Mental Status: He is alert and oriented to person, place, and time. Mental status is at baseline.   Psychiatric:         Mood and Affect: Mood normal.         " Speech: Speech normal.         Thought Content: Thought content normal.         Judgment: Judgment normal.            Lab Results   Component Value Date/Time    POCCOLOR DARK YELLOW 04/05/2020 04:13 PM    POCAPPEAR CLOUDY 04/05/2020 04:13 PM    POCLEUKEST MODERATE 04/05/2020 04:13 PM    POCNITRITE NEG 04/05/2020 04:13 PM    POCUROBILIGE NEG 04/05/2020 04:13 PM    POCPROTEIN 100 04/05/2020 04:13 PM    POCURPH 5.5 04/05/2020 04:13 PM    POCBLOOD LARGE 04/05/2020 04:13 PM    POCSPGRV 1.015 04/05/2020 04:13 PM    POCKETONES NEG 04/05/2020 04:13 PM    POCBILIRUBIN NEG 04/05/2020 04:13 PM    POCGLUCUA NEG 04/05/2020 04:13 PM           Assessment/Plan:       1. Urinary urgency  - POCT Urinalysis    2. Urinary tract infection with hematuria, site unspecified  - Urine Culture; Future  - ciprofloxacin (CIPRO) 500 MG Tab; Take 1 Tab by mouth every 12 hours for 7 days.  Dispense: 14 Tab; Refill: 0    Will call with cx results if I need to change abx, he understands  Take full course of abx  Increase water intake  Tylenol as needed for pain  Supportive care, differential diagnoses, and indications for immediate follow-up discussed with patient.    Pathogenesis of diagnosis discussed including typical length and natural progression.      Instructed to return to  or nearest emergency department if symptoms fail to improve, for any change in condition, further concerns, or new concerning symptoms.  Patient states understanding of the plan of care and discharge instructions.

## 2020-04-06 DIAGNOSIS — R31.9 URINARY TRACT INFECTION WITH HEMATURIA, SITE UNSPECIFIED: ICD-10-CM

## 2020-04-06 DIAGNOSIS — N39.0 URINARY TRACT INFECTION WITH HEMATURIA, SITE UNSPECIFIED: ICD-10-CM

## 2020-04-08 LAB
BACTERIA UR CULT: ABNORMAL
BACTERIA UR CULT: ABNORMAL
SIGNIFICANT IND 70042: ABNORMAL
SITE SITE: ABNORMAL
SOURCE SOURCE: ABNORMAL

## 2023-06-25 ENCOUNTER — HOSPITAL ENCOUNTER (OUTPATIENT)
Facility: MEDICAL CENTER | Age: 88
End: 2023-06-25
Attending: PHYSICIAN ASSISTANT
Payer: MEDICARE

## 2023-06-25 ENCOUNTER — OFFICE VISIT (OUTPATIENT)
Dept: URGENT CARE | Facility: PHYSICIAN GROUP | Age: 88
End: 2023-06-25
Payer: MEDICARE

## 2023-06-25 VITALS
HEIGHT: 71 IN | DIASTOLIC BLOOD PRESSURE: 78 MMHG | BODY MASS INDEX: 26.32 KG/M2 | WEIGHT: 188 LBS | HEART RATE: 77 BPM | RESPIRATION RATE: 20 BRPM | SYSTOLIC BLOOD PRESSURE: 164 MMHG | TEMPERATURE: 97.4 F | OXYGEN SATURATION: 98 %

## 2023-06-25 DIAGNOSIS — C61 PROSTATE CA (HCC): ICD-10-CM

## 2023-06-25 DIAGNOSIS — R35.0 URINARY FREQUENCY: ICD-10-CM

## 2023-06-25 DIAGNOSIS — R39.15 URINARY URGENCY: ICD-10-CM

## 2023-06-25 DIAGNOSIS — N30.01 ACUTE CYSTITIS WITH HEMATURIA: ICD-10-CM

## 2023-06-25 DIAGNOSIS — R03.0 ELEVATED BLOOD PRESSURE READING WITHOUT DIAGNOSIS OF HYPERTENSION: ICD-10-CM

## 2023-06-25 DIAGNOSIS — N30.01 ACUTE CYSTITIS WITH HEMATURIA: Primary | ICD-10-CM

## 2023-06-25 LAB
APPEARANCE UR: NORMAL
BILIRUB UR STRIP-MCNC: NEGATIVE MG/DL
COLOR UR AUTO: YELLOW
GLUCOSE UR STRIP.AUTO-MCNC: NEGATIVE MG/DL
KETONES UR STRIP.AUTO-MCNC: NEGATIVE MG/DL
LEUKOCYTE ESTERASE UR QL STRIP.AUTO: NORMAL
NITRITE UR QL STRIP.AUTO: NEGATIVE
PH UR STRIP.AUTO: 7 [PH] (ref 5–8)
PROT UR QL STRIP: 30 MG/DL
RBC UR QL AUTO: NORMAL
SP GR UR STRIP.AUTO: 1.01
UROBILINOGEN UR STRIP-MCNC: 0.2 MG/DL

## 2023-06-25 PROCEDURE — 99203 OFFICE O/P NEW LOW 30 MIN: CPT | Performed by: PHYSICIAN ASSISTANT

## 2023-06-25 PROCEDURE — 3078F DIAST BP <80 MM HG: CPT | Performed by: PHYSICIAN ASSISTANT

## 2023-06-25 PROCEDURE — 87077 CULTURE AEROBIC IDENTIFY: CPT

## 2023-06-25 PROCEDURE — 3077F SYST BP >= 140 MM HG: CPT | Performed by: PHYSICIAN ASSISTANT

## 2023-06-25 PROCEDURE — 81002 URINALYSIS NONAUTO W/O SCOPE: CPT | Performed by: PHYSICIAN ASSISTANT

## 2023-06-25 PROCEDURE — 87086 URINE CULTURE/COLONY COUNT: CPT

## 2023-06-25 RX ORDER — SULFAMETHOXAZOLE AND TRIMETHOPRIM 800; 160 MG/1; MG/1
1 TABLET ORAL 2 TIMES DAILY
Qty: 14 TABLET | Refills: 0 | Status: SHIPPED | OUTPATIENT
Start: 2023-06-25 | End: 2023-07-02

## 2023-06-25 RX ORDER — APIXABAN 2.5 MG/1
2.5 TABLET, FILM COATED ORAL 2 TIMES DAILY
COMMUNITY
Start: 2023-05-01 | End: 2023-07-13

## 2023-06-25 RX ORDER — ATORVASTATIN CALCIUM 40 MG/1
40 TABLET, FILM COATED ORAL DAILY
COMMUNITY
Start: 2023-04-30

## 2023-06-25 RX ORDER — NITROGLYCERIN 0.4 MG/1
0.4 TABLET SUBLINGUAL
COMMUNITY

## 2023-06-25 ASSESSMENT — ENCOUNTER SYMPTOMS
FLANK PAIN: 0
ANOREXIA: 0
CHANGE IN BOWEL HABIT: 0
FEVER: 0
VOMITING: 0

## 2023-06-25 NOTE — PROGRESS NOTES
Subjective     Miguel Reyna is a 88 y.o. male who presents with UTI (Frequent urination,x3 days)    PMH:  has a past medical history of Cancer (HCC), Myocardial infarct (HCC) (9/24/2011), and Prostate CA (HCC) (01/01/2018).  MEDS:   Current Outpatient Medications:     apixaban (ELIQUIS) 2.5mg Tab, Take 2.5 mg by mouth., Disp: , Rfl:     atorvastatin (LIPITOR) 40 MG Tab, Take 40 mg by mouth every day., Disp: , Rfl:     nitroglycerin (NITROSTAT) 0.4 MG SL Tab, Place 0.4 mg under the tongue every 5 minutes as needed for Chest Pain., Disp: , Rfl:     sulfamethoxazole-trimethoprim (BACTRIM DS) 800-160 MG tablet, Take 1 Tablet by mouth 2 times a day for 7 days., Disp: 14 Tablet, Rfl: 0    DILTIAZem CD (CARTIA XT) 240 MG CAPSULE SR 24 HR, Take 240 mg by mouth every day., Disp: , Rfl:     rivaroxaban (XARELTO) 20 MG Tab tablet, Take 20 mg by mouth with dinner., Disp: , Rfl:     ascorbic acid (ASCORBIC ACID) 500 MG TABS, Take 500 mg by mouth every day., Disp: , Rfl:     therapeutic multivitamin-minerals (THERAGRAN-M) TABS, Take 1 Tab by mouth every day., Disp: , Rfl:     ELIQUIS 2.5 MG Tab, Take 2.5 mg by mouth 2 times a day. (Patient not taking: Reported on 6/25/2023), Disp: , Rfl:     ferrous sulfate 325 (65 Fe) MG tablet, Take 1 Tab by mouth every morning with breakfast. (Patient not taking: Reported on 4/5/2020), Disp: 30 Tab, Rfl: 0    verapamil ER (CALAN-SR) 240 MG Tab CR, Take 240 mg by mouth every day. (Patient not taking: Reported on 6/25/2023), Disp: , Rfl:     Meloxicam 10 MG Cap, Take 20 mg by mouth every day. (Patient not taking: Reported on 6/25/2023), Disp: , Rfl:     atorvastatin (LIPITOR) 20 MG Tab, Take 40 mg by mouth every evening. (Patient not taking: Reported on 6/25/2023), Disp: , Rfl:     aspirin (ASA) 81 MG Chew Tab chewable tablet, Take 81 mg by mouth every day. (Patient not taking: Reported on 6/25/2023), Disp: , Rfl:   ALLERGIES: No Known Allergies  SURGHX:   Past Surgical History:   Procedure  "Laterality Date    IRRIGATION & DEBRIDEMENT ORTHO Left 6/30/2018    Procedure: IRRIGATION & DEBRIDEMENT HAND;  Surgeon: Tien Ta M.D.;  Location: SURGERY [facility];  Service: Orthopedics    TURP-VAPOR  10/01/2012    OTHER ORTHOPEDIC SURGERY  1988    Shoulder bone spur     SOCHX:  reports that he has never smoked. He has never used smokeless tobacco. He reports current alcohol use. He reports that he does not use drugs.  FH: Reviewed with patient, not pertinent to this visit.           Patient presents with:  UTI: Frequent urination,x3 days.  Patient has history of prostate cancer, status post TURP many years ago.  Patient self caths 5 times daily and does not typically have any frequency or urgency sensations in his bladder but he has had urgency and frequency over the last 3 days.  Patient has also noticed an increase in blood in his urine.  Feels like a UTI..  Patient denies fever, chills, nausea vomiting or diarrhea.        UTI  This is a new problem. The current episode started in the past 7 days. The problem occurs constantly. The problem has been gradually worsening. Associated symptoms include urinary symptoms. Pertinent negatives include no anorexia, change in bowel habit, fever or vomiting. He has tried drinking for the symptoms. The treatment provided no relief.       Review of Systems   Constitutional:  Negative for fever.   Gastrointestinal:  Negative for anorexia, change in bowel habit and vomiting.   Genitourinary:  Positive for frequency, hematuria and urgency. Negative for dysuria and flank pain.   All other systems reviewed and are negative.             Objective     BP (!) 164/78 (BP Location: Right arm, Patient Position: Sitting, BP Cuff Size: Adult)   Pulse 77   Temp 36.3 °C (97.4 °F) (Temporal)   Resp 20   Ht 1.803 m (5' 11\")   Wt 85.3 kg (188 lb)   SpO2 98%   BMI 26.22 kg/m²      Physical Exam  Vitals and nursing note reviewed.   Constitutional:       General: He is not in " acute distress.     Appearance: Normal appearance. He is well-developed and normal weight. He is not toxic-appearing.   HENT:      Head: Normocephalic and atraumatic.      Nose: Nose normal.      Mouth/Throat:      Mouth: Mucous membranes are moist.   Eyes:      Extraocular Movements: Extraocular movements intact.      Conjunctiva/sclera: Conjunctivae normal.      Pupils: Pupils are equal, round, and reactive to light.   Cardiovascular:      Rate and Rhythm: Normal rate and regular rhythm.      Pulses: Normal pulses.      Heart sounds: Normal heart sounds.   Pulmonary:      Effort: Pulmonary effort is normal.      Breath sounds: Normal breath sounds.   Abdominal:      General: Bowel sounds are normal.      Palpations: Abdomen is soft.      Tenderness: There is no guarding or rebound.   Musculoskeletal:         General: Normal range of motion.      Cervical back: Normal range of motion and neck supple.   Skin:     General: Skin is warm and dry.      Capillary Refill: Capillary refill takes less than 2 seconds.   Neurological:      General: No focal deficit present.      Mental Status: He is alert and oriented to person, place, and time.      Gait: Gait normal.   Psychiatric:         Mood and Affect: Mood normal.         Behavior: Behavior is cooperative.                             Assessment & Plan               1. Acute cystitis with hematuria  sulfamethoxazole-trimethoprim (BACTRIM DS) 800-160 MG tablet    URINE CULTURE(NEW)    POCT Urinalysis      2. Urinary urgency  sulfamethoxazole-trimethoprim (BACTRIM DS) 800-160 MG tablet    URINE CULTURE(NEW)    POCT Urinalysis      3. Urinary frequency  sulfamethoxazole-trimethoprim (BACTRIM DS) 800-160 MG tablet    URINE CULTURE(NEW)    POCT Urinalysis      4. Prostate CA (HCC)  sulfamethoxazole-trimethoprim (BACTRIM DS) 800-160 MG tablet    URINE CULTURE(NEW)    POCT Urinalysis      5. Elevated blood pressure reading without diagnosis of hypertension          I will  begin treatment with Bactrim DS today for UTI symptoms based on previous urine culture results.     Culture sent to lab, will call with any necessary treatment or treatment changes.      Your blood pressure reading was elevated in clinic today.  We suggest you follow up with your PCP to discuss this finding.       Differential diagnosis, supportive care, and indications for immediate follow-up discussed with patient.  Instructed to return to clinic or nearest emergency department for any change in condition, further concerns, or worsening of symptoms.    I personally reviewed prior external notes and test results pertinent to today's visit.  I have independently reviewed and interpreted all diagnostics ordered during this urgent care visit.    PT should follow up with PCP in 1-2 days for re-evaluation if symptoms have not improved.      Discussed red flags and reasons to return to UC or ED.      Pt and/or family verbalized understanding of diagnosis and follow up instructions and was offered informational handout on diagnosis.  PT discharged.     Please note that this dictation was created using voice recognition software. I have made every reasonable attempt to correct obvious errors, but I expect that there may be errors of grammar and possibly content that I did not discover before finalizing the note.

## 2023-07-13 ENCOUNTER — OFFICE VISIT (OUTPATIENT)
Dept: URGENT CARE | Facility: PHYSICIAN GROUP | Age: 88
End: 2023-07-13
Payer: MEDICARE

## 2023-07-13 ENCOUNTER — HOSPITAL ENCOUNTER (OUTPATIENT)
Dept: LAB | Facility: MEDICAL CENTER | Age: 88
End: 2023-07-13
Attending: PHYSICIAN ASSISTANT
Payer: MEDICARE

## 2023-07-13 ENCOUNTER — HOSPITAL ENCOUNTER (OUTPATIENT)
Facility: MEDICAL CENTER | Age: 88
End: 2023-07-13
Attending: PHYSICIAN ASSISTANT
Payer: MEDICARE

## 2023-07-13 VITALS
WEIGHT: 188 LBS | DIASTOLIC BLOOD PRESSURE: 84 MMHG | SYSTOLIC BLOOD PRESSURE: 120 MMHG | BODY MASS INDEX: 26.32 KG/M2 | OXYGEN SATURATION: 98 % | TEMPERATURE: 98.1 F | HEIGHT: 71 IN | RESPIRATION RATE: 16 BRPM | HEART RATE: 71 BPM

## 2023-07-13 DIAGNOSIS — N39.0 COMPLICATED UTI (URINARY TRACT INFECTION): ICD-10-CM

## 2023-07-13 DIAGNOSIS — N39.0 RECURRENT UTI: ICD-10-CM

## 2023-07-13 DIAGNOSIS — C61 PROSTATE CA (HCC): ICD-10-CM

## 2023-07-13 PROBLEM — Z86.79 HISTORY OF ATRIAL FIBRILLATION: Status: ACTIVE | Noted: 2017-12-29

## 2023-07-13 PROBLEM — E78.5 DYSLIPIDEMIA: Status: ACTIVE | Noted: 2023-07-13

## 2023-07-13 PROBLEM — K59.00 CONSTIPATION: Status: ACTIVE | Noted: 2019-05-16

## 2023-07-13 PROBLEM — R31.0 GROSS HEMATURIA: Status: ACTIVE | Noted: 2020-04-07

## 2023-07-13 PROBLEM — N30.41 IRRADIATION CYSTITIS WITH HEMATURIA: Status: ACTIVE | Noted: 2021-06-21

## 2023-07-13 LAB
ALBUMIN SERPL BCP-MCNC: 4.1 G/DL (ref 3.2–4.9)
ALBUMIN/GLOB SERPL: 1.6 G/DL
ALP SERPL-CCNC: 75 U/L (ref 30–99)
ALT SERPL-CCNC: 16 U/L (ref 2–50)
ANION GAP SERPL CALC-SCNC: 10 MMOL/L (ref 7–16)
APPEARANCE UR: NORMAL
AST SERPL-CCNC: 21 U/L (ref 12–45)
BILIRUB SERPL-MCNC: 0.7 MG/DL (ref 0.1–1.5)
BILIRUB UR STRIP-MCNC: NEGATIVE MG/DL
BUN SERPL-MCNC: 19 MG/DL (ref 8–22)
CALCIUM ALBUM COR SERPL-MCNC: 9.1 MG/DL (ref 8.5–10.5)
CALCIUM SERPL-MCNC: 9.2 MG/DL (ref 8.5–10.5)
CHLORIDE SERPL-SCNC: 107 MMOL/L (ref 96–112)
CO2 SERPL-SCNC: 24 MMOL/L (ref 20–33)
COLOR UR AUTO: YELLOW
CREAT SERPL-MCNC: 1.15 MG/DL (ref 0.5–1.4)
GFR SERPLBLD CREATININE-BSD FMLA CKD-EPI: 61 ML/MIN/1.73 M 2
GLOBULIN SER CALC-MCNC: 2.6 G/DL (ref 1.9–3.5)
GLUCOSE SERPL-MCNC: 107 MG/DL (ref 65–99)
GLUCOSE UR STRIP.AUTO-MCNC: NEGATIVE MG/DL
KETONES UR STRIP.AUTO-MCNC: NEGATIVE MG/DL
LEUKOCYTE ESTERASE UR QL STRIP.AUTO: NORMAL
NITRITE UR QL STRIP.AUTO: NEGATIVE
PH UR STRIP.AUTO: 7 [PH] (ref 5–8)
POTASSIUM SERPL-SCNC: 4.5 MMOL/L (ref 3.6–5.5)
PROT SERPL-MCNC: 6.7 G/DL (ref 6–8.2)
PROT UR QL STRIP: NORMAL MG/DL
RBC UR QL AUTO: NORMAL
SODIUM SERPL-SCNC: 141 MMOL/L (ref 135–145)
SP GR UR STRIP.AUTO: 1.01
UROBILINOGEN UR STRIP-MCNC: 0.2 MG/DL

## 2023-07-13 PROCEDURE — 99214 OFFICE O/P EST MOD 30 MIN: CPT | Performed by: PHYSICIAN ASSISTANT

## 2023-07-13 PROCEDURE — 3079F DIAST BP 80-89 MM HG: CPT | Performed by: PHYSICIAN ASSISTANT

## 2023-07-13 PROCEDURE — 81002 URINALYSIS NONAUTO W/O SCOPE: CPT | Performed by: PHYSICIAN ASSISTANT

## 2023-07-13 PROCEDURE — 80053 COMPREHEN METABOLIC PANEL: CPT

## 2023-07-13 PROCEDURE — 36415 COLL VENOUS BLD VENIPUNCTURE: CPT

## 2023-07-13 PROCEDURE — 87086 URINE CULTURE/COLONY COUNT: CPT

## 2023-07-13 PROCEDURE — 3074F SYST BP LT 130 MM HG: CPT | Performed by: PHYSICIAN ASSISTANT

## 2023-07-13 RX ORDER — SULFAMETHOXAZOLE AND TRIMETHOPRIM 800; 160 MG/1; MG/1
1 TABLET ORAL 2 TIMES DAILY
Qty: 28 TABLET | Refills: 0 | Status: SHIPPED | OUTPATIENT
Start: 2023-07-13 | End: 2023-07-27

## 2023-07-13 ASSESSMENT — ENCOUNTER SYMPTOMS
VOMITING: 0
FLANK PAIN: 0
CARDIOVASCULAR NEGATIVE: 1
CONSTITUTIONAL NEGATIVE: 1
ABDOMINAL PAIN: 0
NAUSEA: 0
DIARRHEA: 0
RESPIRATORY NEGATIVE: 1

## 2023-07-13 NOTE — PROGRESS NOTES
"  Subjective:     Miguel Reyna  is a 88 y.o. male who presents for UTI (Urgency, uti are happening more often)       He presents today with urinary urgency has been ongoing over the past few days.  Does note recent recurrent UTIs recently, patient does follow urology in [place] but is in town traveling.  He states that he does self-catheterization and has been doing this for long periods of time and states that he appropriately cleans the catheter following use.  States that his recent UTIs have been treated with Bactrim which do seem to fully resolve the symptoms before they returned shortly after completing the antibiotic regimen.  He denies fevers, no flank pain.  Does have past medical history positive for prostate cancer.       Review of Systems   Constitutional: Negative.    Respiratory: Negative.     Cardiovascular: Negative.    Gastrointestinal:  Negative for abdominal pain, diarrhea, nausea and vomiting.   Genitourinary:  Positive for dysuria, frequency and urgency. Negative for flank pain and hematuria.      No Known Allergies  Past Medical History:   Diagnosis Date    Cancer (HCC)     Myocardial infarct (HCC) 9/24/2011    Prostate CA (HCC) 01/01/2018        Objective:   /84 (BP Location: Right arm, Patient Position: Sitting, BP Cuff Size: Adult)   Pulse 71   Temp 36.7 °C (98.1 °F) (Temporal)   Resp 16   Ht 1.803 m (5' 11\")   Wt 85.3 kg (188 lb)   SpO2 98%   BMI 26.22 kg/m²   Physical Exam  Vitals and nursing note reviewed.   Constitutional:       General: He is not in acute distress.     Appearance: He is not ill-appearing or toxic-appearing.   HENT:      Head: Normocephalic.      Nose: No rhinorrhea.   Eyes:      General: No scleral icterus.     Conjunctiva/sclera: Conjunctivae normal.   Pulmonary:      Effort: Pulmonary effort is normal. No respiratory distress.      Breath sounds: No stridor.   Abdominal:      General: Abdomen is flat. Bowel sounds are normal. There is no distension.      " Palpations: Abdomen is soft.      Tenderness: There is no abdominal tenderness. There is no right CVA tenderness, left CVA tenderness, guarding or rebound.   Musculoskeletal:      Cervical back: Neck supple.   Neurological:      Mental Status: He is alert and oriented to person, place, and time.   Psychiatric:         Mood and Affect: Mood normal.         Behavior: Behavior normal.         Thought Content: Thought content normal.         Judgment: Judgment normal.             Diagnostic testing:    POC urinalysis-trace blood, trace protein, large leukocytes, all others within normal limits    Urine culture-pending    Assessment/Plan:     Encounter Diagnoses   Name Primary?    Complicated UTI (urinary tract infection)     Recurrent UTI     Prostate CA (HCC)           Plan for care for today's complaint includes starting the patient on Bactrim but also obtaining BMP to evaluate for his kidney and liver function.  Patient has had recurrent UTIs with several occurring within the last 2 months, has previously been treated with Bactrim for 7 days at a time which does resolve his symptoms but her symptoms returned shortly thereafter; we will provide him with a 14-day prescription at this as he is now met criteria for complicated UTI and is possibly experiencing prostatitis.  No evidence of pyelonephritis noted on exam today, vital signs are stable patient is well-appearing on exam today..  We will have him follow-up with his urologist for further evaluation and management, continue self-catheterization as he has been doing previously.  Urine culture obtained, will contact patient via phone call to discuss results once it is obtained.  Continue to monitor symptoms and return to urgent care or follow-up with primary care provider if symptoms remain ongoing.  Follow-up in the emergency department if symptoms become severe, ER precautions discussed in office today..  Prescription for Bactrim provided.    See AVS Instructions  below for written guidance provided to patient on after-visit management and care in addition to our verbal discussion during the visit.    Please note that this dictation was created using voice recognition software. I have attempted to correct all errors, but there may be sound-alike, spelling, grammar and possibly content errors that I did not discover before finalizing the note.    Wing Neri PA-C

## 2023-07-15 LAB
BACTERIA UR CULT: NORMAL
SIGNIFICANT IND 70042: NORMAL
SITE SITE: NORMAL
SOURCE SOURCE: NORMAL

## 2023-09-01 ENCOUNTER — HOSPITAL ENCOUNTER (OUTPATIENT)
Facility: MEDICAL CENTER | Age: 88
End: 2023-09-01
Attending: REGISTERED NURSE
Payer: MEDICARE

## 2023-09-01 ENCOUNTER — OFFICE VISIT (OUTPATIENT)
Dept: URGENT CARE | Facility: PHYSICIAN GROUP | Age: 88
End: 2023-09-01
Payer: MEDICARE

## 2023-09-01 VITALS
TEMPERATURE: 98.1 F | RESPIRATION RATE: 16 BRPM | SYSTOLIC BLOOD PRESSURE: 130 MMHG | DIASTOLIC BLOOD PRESSURE: 70 MMHG | WEIGHT: 188 LBS | HEIGHT: 71 IN | HEART RATE: 84 BPM | BODY MASS INDEX: 26.32 KG/M2

## 2023-09-01 DIAGNOSIS — R39.15 URINARY URGENCY: ICD-10-CM

## 2023-09-01 LAB
APPEARANCE UR: CLEAR
BILIRUB UR STRIP-MCNC: NORMAL MG/DL
COLOR UR AUTO: YELLOW
GLUCOSE UR STRIP.AUTO-MCNC: NORMAL MG/DL
KETONES UR STRIP.AUTO-MCNC: NORMAL MG/DL
LEUKOCYTE ESTERASE UR QL STRIP.AUTO: NORMAL
NITRITE UR QL STRIP.AUTO: NORMAL
PH UR STRIP.AUTO: 6 [PH] (ref 5–8)
PROT UR QL STRIP: NORMAL MG/DL
RBC UR QL AUTO: NORMAL
SP GR UR STRIP.AUTO: 1.02
UROBILINOGEN UR STRIP-MCNC: 0.2 MG/DL

## 2023-09-01 PROCEDURE — 3078F DIAST BP <80 MM HG: CPT | Performed by: REGISTERED NURSE

## 2023-09-01 PROCEDURE — 3075F SYST BP GE 130 - 139MM HG: CPT | Performed by: REGISTERED NURSE

## 2023-09-01 PROCEDURE — 87086 URINE CULTURE/COLONY COUNT: CPT

## 2023-09-01 PROCEDURE — 1126F AMNT PAIN NOTED NONE PRSNT: CPT | Performed by: REGISTERED NURSE

## 2023-09-01 PROCEDURE — 99213 OFFICE O/P EST LOW 20 MIN: CPT | Performed by: REGISTERED NURSE

## 2023-09-01 PROCEDURE — 81002 URINALYSIS NONAUTO W/O SCOPE: CPT | Performed by: REGISTERED NURSE

## 2023-09-01 ASSESSMENT — ENCOUNTER SYMPTOMS
FEVER: 0
DIZZINESS: 0
VOMITING: 0
ABDOMINAL PAIN: 0
NAUSEA: 0
SHORTNESS OF BREATH: 0
CHILLS: 0
FLANK PAIN: 0

## 2023-09-01 ASSESSMENT — PAIN SCALES - GENERAL: PAINLEVEL: NO PAIN

## 2023-09-01 NOTE — PROGRESS NOTES
Subjective:   Miguel Reyna is a 88 y.o. male who presents for UTI (X 3 days urgency )    HPI  3 days of urinary urgency and frequency. No dysuria or burning. Occasional, hematuria but this is not uncommon given he self cath's.  Does have a history of UTIs, multiple in the past year, does follow with urology and has paperwork from last visit.  Urologist notes that he does not want patient started on antibiotics until culture comes back positive for bacterial growth.  Looking back in chart last urine culture came back negative for bacterial growth.  Was recently on Bactrim.  Tolerating p.o.    Call [phone].     Review of Systems   Constitutional:  Negative for chills, fever and malaise/fatigue.   Respiratory:  Negative for shortness of breath.    Cardiovascular:  Negative for chest pain.   Gastrointestinal:  Negative for abdominal pain, nausea and vomiting.   Genitourinary:  Positive for frequency and urgency. Negative for dysuria and flank pain.   Neurological:  Negative for dizziness.       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    Gross hematuria 04/07/2020    Constipation 05/16/2019    Primary malignant neoplasm of prostate (HCC) 12/12/2018    Hyperglycemia 07/01/2018    Iron deficiency anemia 07/01/2018    Cellulitis and abscess of hand 06/30/2018    Atrial fibrillation (HCC) 06/30/2018    CAD (coronary artery disease) 06/30/2018    History of atrial fibrillation 12/29/2017    Myocardial infarction, inferior, acute, initial episode (HCC) 09/26/2011    Carotid bruit 09/26/2011    Hypercholesteremia 09/26/2011       Current Outpatient Medications Ordered in Epic   Medication Sig Dispense Refill    ATORVASTATIN CALCIUM PO Take  by mouth.      apixaban (ELIQUIS) 2.5mg Tab Take 2.5 mg by mouth.      atorvastatin (LIPITOR) 40 MG Tab Take 40 mg by mouth every day.      nitroglycerin (NITROSTAT) 0.4 MG  "SL Tab Place 0.4 mg under the tongue every 5 minutes as needed for Chest Pain.      DILTIAZem CD (CARTIA XT) 240 MG CAPSULE SR 24 HR Take 240 mg by mouth every day.      rivaroxaban (XARELTO) 20 MG Tab tablet Take 20 mg by mouth with dinner.      ascorbic acid (ASCORBIC ACID) 500 MG TABS Take 500 mg by mouth every day.      therapeutic multivitamin-minerals (THERAGRAN-M) TABS Take 1 Tab by mouth every day.       No current [facility]-ordered facility-administered medications on file.       Past Surgical History:   Procedure Laterality Date    IRRIGATION & DEBRIDEMENT ORTHO Left 6/30/2018    Procedure: IRRIGATION & DEBRIDEMENT HAND;  Surgeon: Tien Ta M.D.;  Location: SURGERY [facility];  Service: Orthopedics    TURP-VAPOR  10/01/2012    OTHER ORTHOPEDIC SURGERY  1988    Shoulder bone spur       Social History     Tobacco Use    Smoking status: Never    Smokeless tobacco: Never   Substance Use Topics    Alcohol use: Yes    Drug use: No       family history includes Heart Disease in his father and mother.     Problem list, medications, and allergies reviewed by myself today in Epic.     Objective:   /70 (BP Location: Left arm, Patient Position: Sitting, BP Cuff Size: Adult)   Pulse 84   Temp 36.7 °C (98.1 °F)   Resp 16   Ht 1.803 m (5' 11\")   Wt 85.3 kg (188 lb)   BMI 26.22 kg/m²     Physical Exam  Vitals and nursing note reviewed.   Constitutional:       General: He is not in acute distress.     Appearance: Normal appearance. He is not ill-appearing or toxic-appearing.   HENT:      Head: Normocephalic.      Mouth/Throat:      Mouth: Mucous membranes are moist.   Eyes:      General: No scleral icterus.  Cardiovascular:      Rate and Rhythm: Normal rate and regular rhythm.   Pulmonary:      Effort: Pulmonary effort is normal.      Breath sounds: Normal breath sounds.   Abdominal:      General: Abdomen is flat.      Palpations: Abdomen is soft.      Tenderness: There is no abdominal tenderness. There is " no right CVA tenderness, left CVA tenderness or guarding.   Musculoskeletal:         General: Normal range of motion.      Cervical back: Normal range of motion.   Skin:     General: Skin is warm.      Capillary Refill: Capillary refill takes less than 2 seconds.   Neurological:      General: No focal deficit present.      Mental Status: He is alert and oriented to person, place, and time.   Psychiatric:         Mood and Affect: Mood normal.         Assessment/Plan:     Diagnosis and associated orders:   1. Urinary urgency  POCT Urinalysis    URINE CULTURE(NEW)         Comments/MDM:   Differential diagnosis discussed     Presentation and pertinent history: Pleasant, non-toxic appearance, presenting with 3 days of urinary frequency and urgency.  History of UTIs, patient does self cath, he does follow with urology.  Last urology note states that he does not want the patient taking antibiotics unless cultures positive for bacterial growth.  Last urine culture was negative for bacterial growth.  He is tolerating p.o.  No fever chills or body aches.  No flank pain.    Vital signs: Thankfully vitals signs are within normal limits    Testing: UA positive for trace blood and leuks.    Exam: Well appearing, non-toxic, moist mucous membranes, no abdominal, suprapubic, CVA tenderness, no adventitious heart or lung sounds, remainder of exam benign w/o redflag signs or symptoms    Plan: Based on neurologist recommendations we will hold antibiotics and we will send urine for culture, start antibiotics if indicated.  Significantly increase fluid intake.  May use over-the-counter medications including Motrin or Azo if tolerated.  We reviewed signs and symptoms of worsening infection at length.  Education provided.  Patient's phone number in HPI to contact with results    Follow up: Follow up with primary care provider     Return to clinic or go to ED if symptoms worsen or persist. Indications for ED discussed at length.  Patient/Parent/Guardian voices understanding. Follow-up with your primary care provider in 3-5 days. Red flag symptoms discussed. All side effects of medication discussed including allergic response, GI upset, tendon injury, rash, sedation etc.    I personally reviewed prior external notes and test results pertinent to today's visit as well as additional imaging and testing completed in clinic today.     Please note that this dictation was created using voice recognition software. I have made every reasonable attempt to correct obvious errors, but I expect that there are errors of grammar and possibly content that I did not discover before finalizing the note.    This note was electronically signed by ALINA Phan

## 2023-09-03 LAB
BACTERIA UR CULT: NORMAL
SIGNIFICANT IND 70042: NORMAL
SITE SITE: NORMAL
SOURCE SOURCE: NORMAL

## 2023-09-30 ENCOUNTER — HOSPITAL ENCOUNTER (OUTPATIENT)
Facility: MEDICAL CENTER | Age: 88
End: 2023-09-30
Attending: PHYSICIAN ASSISTANT
Payer: MEDICARE

## 2023-09-30 ENCOUNTER — OFFICE VISIT (OUTPATIENT)
Dept: URGENT CARE | Facility: PHYSICIAN GROUP | Age: 88
End: 2023-09-30
Payer: MEDICARE

## 2023-09-30 VITALS
RESPIRATION RATE: 20 BRPM | TEMPERATURE: 98.3 F | HEART RATE: 68 BPM | BODY MASS INDEX: 26.18 KG/M2 | HEIGHT: 71 IN | WEIGHT: 187 LBS | SYSTOLIC BLOOD PRESSURE: 132 MMHG | DIASTOLIC BLOOD PRESSURE: 72 MMHG | OXYGEN SATURATION: 95 %

## 2023-09-30 DIAGNOSIS — N30.01 ACUTE CYSTITIS WITH HEMATURIA: ICD-10-CM

## 2023-09-30 LAB
APPEARANCE UR: NORMAL
BILIRUB UR STRIP-MCNC: NEGATIVE MG/DL
COLOR UR AUTO: NORMAL
GLUCOSE UR STRIP.AUTO-MCNC: NEGATIVE MG/DL
KETONES UR STRIP.AUTO-MCNC: NEGATIVE MG/DL
LEUKOCYTE ESTERASE UR QL STRIP.AUTO: NORMAL
NITRITE UR QL STRIP.AUTO: NEGATIVE
PH UR STRIP.AUTO: 6.5 [PH] (ref 5–8)
PROT UR QL STRIP: 30 MG/DL
RBC UR QL AUTO: NORMAL
SP GR UR STRIP.AUTO: 1.02
UROBILINOGEN UR STRIP-MCNC: 0.2 MG/DL

## 2023-09-30 PROCEDURE — 81002 URINALYSIS NONAUTO W/O SCOPE: CPT | Performed by: PHYSICIAN ASSISTANT

## 2023-09-30 PROCEDURE — 3075F SYST BP GE 130 - 139MM HG: CPT | Performed by: PHYSICIAN ASSISTANT

## 2023-09-30 PROCEDURE — 87086 URINE CULTURE/COLONY COUNT: CPT

## 2023-09-30 PROCEDURE — 99213 OFFICE O/P EST LOW 20 MIN: CPT | Performed by: PHYSICIAN ASSISTANT

## 2023-09-30 PROCEDURE — 3078F DIAST BP <80 MM HG: CPT | Performed by: PHYSICIAN ASSISTANT

## 2023-09-30 RX ORDER — ABIRATERONE ACETATE 250 MG/1
1000 TABLET ORAL DAILY
COMMUNITY
Start: 2023-09-12 | End: 2024-09-11

## 2023-09-30 RX ORDER — POLYETHYLENE GLYCOL 3350, SODIUM SULFATE ANHYDROUS, SODIUM BICARBONATE, SODIUM CHLORIDE, POTASSIUM CHLORIDE 236; 22.74; 6.74; 5.86; 2.97 G/4L; G/4L; G/4L; G/4L; G/4L
POWDER, FOR SOLUTION ORAL
COMMUNITY
Start: 2023-08-21

## 2023-09-30 RX ORDER — PREDNISONE 5 MG/1
5 TABLET ORAL DAILY
COMMUNITY
Start: 2023-08-26

## 2023-09-30 RX ORDER — SULFAMETHOXAZOLE AND TRIMETHOPRIM 800; 160 MG/1; MG/1
1 TABLET ORAL 2 TIMES DAILY
Qty: 20 TABLET | Refills: 0 | Status: SHIPPED | OUTPATIENT
Start: 2023-09-30 | End: 2023-10-10

## 2023-09-30 ASSESSMENT — ENCOUNTER SYMPTOMS
EYE PAIN: 0
COUGH: 0
ABDOMINAL PAIN: 0
SORE THROAT: 0
HEADACHES: 0
VOMITING: 0
CHILLS: 0
SHORTNESS OF BREATH: 0
MYALGIAS: 0
FEVER: 0
CONSTIPATION: 0
DIARRHEA: 0
NAUSEA: 0

## 2023-09-30 NOTE — PROGRESS NOTES
"Subjective:   Miguel Reyna is a 88 y.o. male who presents for UTI (Urinating frequently, burning x 1 day)      88-year-old male presents with dysuria frequency and urgency starting yesterday.  Has history of prostate cancer, underwent radiation therapy last month, subsequently had some dysuria but had a negative urinalysis and negative urine culture on the first.  Has to self cath is typically very careful but has had several urinary tract infections and this feels similar.  No fevers or chills    Review of Systems   Constitutional:  Negative for chills and fever.   HENT:  Negative for congestion, ear pain and sore throat.    Eyes:  Negative for pain.   Respiratory:  Negative for cough and shortness of breath.    Cardiovascular:  Negative for chest pain.   Gastrointestinal:  Negative for abdominal pain, constipation, diarrhea, nausea and vomiting.   Genitourinary:  Positive for dysuria, frequency and urgency.   Musculoskeletal:  Negative for myalgias.   Skin:  Negative for rash.   Neurological:  Negative for headaches.       Medications, Allergies, and current problem list reviewed today in Epic.     Objective:     /72 (BP Location: Right arm, Patient Position: Sitting, BP Cuff Size: Adult)   Pulse 68   Temp 36.8 °C (98.3 °F) (Temporal)   Resp 20   Ht 1.803 m (5' 11\")   Wt 84.8 kg (187 lb)   SpO2 95%     Physical Exam  Vitals reviewed.   Constitutional:       Appearance: Normal appearance.   HENT:      Head: Normocephalic and atraumatic.      Right Ear: External ear normal.      Left Ear: External ear normal.      Nose: Nose normal.      Mouth/Throat:      Mouth: Mucous membranes are moist.   Eyes:      Conjunctiva/sclera: Conjunctivae normal.   Cardiovascular:      Rate and Rhythm: Normal rate.   Pulmonary:      Effort: Pulmonary effort is normal.   Abdominal:      Tenderness: There is no right CVA tenderness or left CVA tenderness.   Skin:     General: Skin is warm and dry.      Capillary Refill: " Capillary refill takes less than 2 seconds.   Neurological:      Mental Status: He is alert and oriented to person, place, and time.         Assessment/Plan:     Diagnosis and associated orders:     1. Acute cystitis with hematuria  POCT Urinalysis    URINE CULTURE(NEW)    sulfamethoxazole-trimethoprim (BACTRIM DS) 800-160 MG tablet         Comments/MDM:     7/13/23 GFR approx 61  UA consistent with urinalysis with large blood, 30 mg/Lois protein and moderate leuk esterase.  Significantly worse than previous urinalysis on 9/1 that had a negative urine culture  Potentially radiation-induced cystitis but given timing would suspect infection given UA results.  We will put on Bactrim as he has tolerated this in the past and culture to help guide therapy.  Follow-up as needed.  Will call if need to change antimicrobial therapy       Differential diagnosis, natural history, supportive care, and indications for immediate follow-up discussed.    Advised the patient to follow-up with the primary care physician for recheck, reevaluation, and consideration of further management.    Please note that this dictation was created using voice recognition software. I have made a reasonable attempt to correct obvious errors, but I expect that there are errors of grammar and possibly content that I did not discover before finalizing the note.    This note was electronically signed by Arjun Brown PA-C

## 2023-10-02 LAB
BACTERIA UR CULT: NORMAL
SIGNIFICANT IND 70042: NORMAL
SITE SITE: NORMAL
SOURCE SOURCE: NORMAL

## 2024-05-07 ENCOUNTER — APPOINTMENT (OUTPATIENT)
Dept: RADIOLOGY | Facility: MEDICAL CENTER | Age: 89
End: 2024-05-07
Payer: MEDICARE

## 2024-05-08 ENCOUNTER — APPOINTMENT (OUTPATIENT)
Dept: RADIOLOGY | Facility: MEDICAL CENTER | Age: 89
DRG: 186 | End: 2024-05-08
Attending: STUDENT IN AN ORGANIZED HEALTH CARE EDUCATION/TRAINING PROGRAM
Payer: MEDICARE

## 2024-05-08 ENCOUNTER — APPOINTMENT (OUTPATIENT)
Dept: CARDIOLOGY | Facility: MEDICAL CENTER | Age: 89
DRG: 186 | End: 2024-05-08
Attending: FAMILY MEDICINE
Payer: MEDICARE

## 2024-05-08 ENCOUNTER — HOSPITAL ENCOUNTER (INPATIENT)
Facility: MEDICAL CENTER | Age: 89
LOS: 2 days | DRG: 186 | End: 2024-05-10
Attending: STUDENT IN AN ORGANIZED HEALTH CARE EDUCATION/TRAINING PROGRAM | Admitting: STUDENT IN AN ORGANIZED HEALTH CARE EDUCATION/TRAINING PROGRAM
Payer: MEDICARE

## 2024-05-08 DIAGNOSIS — J81.0 ACUTE PULMONARY EDEMA (HCC): ICD-10-CM

## 2024-05-08 DIAGNOSIS — J96.01 ACUTE RESPIRATORY FAILURE WITH HYPOXIA (HCC): ICD-10-CM

## 2024-05-08 DIAGNOSIS — E87.6 HYPOKALEMIA: ICD-10-CM

## 2024-05-08 DIAGNOSIS — J90 PLEURAL EFFUSION: ICD-10-CM

## 2024-05-08 PROBLEM — R33.8 ACUTE URINARY RETENTION: Status: ACTIVE | Noted: 2024-05-08

## 2024-05-08 PROBLEM — R73.03 PREDIABETES: Status: ACTIVE | Noted: 2024-05-08

## 2024-05-08 PROBLEM — N39.0 UTI (URINARY TRACT INFECTION): Status: ACTIVE | Noted: 2024-05-08

## 2024-05-08 PROBLEM — R09.02 HYPOXIA: Status: ACTIVE | Noted: 2024-05-08

## 2024-05-08 PROBLEM — Z71.89 ACP (ADVANCE CARE PLANNING): Status: ACTIVE | Noted: 2024-05-08

## 2024-05-08 PROBLEM — J45.909 REACTIVE AIRWAY DISEASE: Status: ACTIVE | Noted: 2024-05-08

## 2024-05-08 PROBLEM — I48.19 PERSISTENT ATRIAL FIBRILLATION (HCC): Status: ACTIVE | Noted: 2018-06-30

## 2024-05-08 PROBLEM — I13.10 CARDIORENAL SYNDROME: Status: ACTIVE | Noted: 2024-05-08

## 2024-05-08 PROBLEM — I50.9 CONGESTIVE HEART FAILURE (CHF) (HCC): Status: ACTIVE | Noted: 2024-05-08

## 2024-05-08 PROBLEM — J18.9 CAP (COMMUNITY ACQUIRED PNEUMONIA): Status: ACTIVE | Noted: 2024-05-08

## 2024-05-08 PROBLEM — N17.9 AKI (ACUTE KIDNEY INJURY) (HCC): Status: ACTIVE | Noted: 2024-05-08

## 2024-05-08 PROBLEM — E83.42 HYPOMAGNESEMIA: Status: ACTIVE | Noted: 2024-05-08

## 2024-05-08 PROBLEM — R33.9 URINARY RETENTION: Status: ACTIVE | Noted: 2024-05-08

## 2024-05-08 PROBLEM — I10 HTN (HYPERTENSION), BENIGN: Status: ACTIVE | Noted: 2024-05-08

## 2024-05-08 PROBLEM — R09.02 HYPOXIA: Status: RESOLVED | Noted: 2024-05-08 | Resolved: 2024-05-08

## 2024-05-08 PROBLEM — C79.51 PRIMARY MALIGNANT NEOPLASM OF PROSTATE METASTATIC TO BONE (HCC): Status: ACTIVE | Noted: 2018-12-12

## 2024-05-08 PROBLEM — D63.8 ANEMIA, CHRONIC DISEASE: Status: ACTIVE | Noted: 2024-05-08

## 2024-05-08 LAB
ALBUMIN SERPL BCP-MCNC: 3.6 G/DL (ref 3.2–4.9)
ALBUMIN/GLOB SERPL: 1.3 G/DL
ALP SERPL-CCNC: 89 U/L (ref 30–99)
ALT SERPL-CCNC: 16 U/L (ref 2–50)
AMYLASE FLD-CCNC: 10 U/L
ANION GAP SERPL CALC-SCNC: 11 MMOL/L (ref 7–16)
APPEARANCE FLD: CLEAR
APPEARANCE UR: CLEAR
APTT PPP: 26.4 SEC (ref 24.7–36)
AST SERPL-CCNC: 21 U/L (ref 12–45)
BACTERIA #/AREA URNS HPF: NEGATIVE /HPF
BASOPHILS # BLD AUTO: 0.5 % (ref 0–1.8)
BASOPHILS # BLD: 0.03 K/UL (ref 0–0.12)
BILIRUB SERPL-MCNC: 0.5 MG/DL (ref 0.1–1.5)
BILIRUB UR QL STRIP.AUTO: NEGATIVE
BODY FLD TYPE: NORMAL
BUN SERPL-MCNC: 28 MG/DL (ref 8–22)
CALCIUM ALBUM COR SERPL-MCNC: 9.3 MG/DL (ref 8.5–10.5)
CALCIUM SERPL-MCNC: 9 MG/DL (ref 8.5–10.5)
CHLORIDE SERPL-SCNC: 105 MMOL/L (ref 96–112)
CK SERPL-CCNC: 59 U/L (ref 0–154)
CO2 SERPL-SCNC: 24 MMOL/L (ref 20–33)
COLOR FLD: YELLOW
COLOR UR: YELLOW
CORTIS SERPL-MCNC: 1.4 UG/DL (ref 0–23)
CREAT SERPL-MCNC: 1.17 MG/DL (ref 0.5–1.4)
CREAT UR-MCNC: 23.22 MG/DL
CRP SERPL HS-MCNC: 0.6 MG/DL (ref 0–0.75)
EKG IMPRESSION: NORMAL
EOSINOPHIL # BLD AUTO: 0.13 K/UL (ref 0–0.51)
EOSINOPHIL NFR BLD: 2.2 % (ref 0–6.9)
EPI CELLS #/AREA URNS HPF: NEGATIVE /HPF
ERYTHROCYTE [DISTWIDTH] IN BLOOD BY AUTOMATED COUNT: 50.4 FL (ref 35.9–50)
ERYTHROCYTE [SEDIMENTATION RATE] IN BLOOD BY WESTERGREN METHOD: 64 MM/HOUR (ref 0–20)
FUNGUS SPEC FUNGUS STN: NORMAL
GFR SERPLBLD CREATININE-BSD FMLA CKD-EPI: 59 ML/MIN/1.73 M 2
GLOBULIN SER CALC-MCNC: 2.7 G/DL (ref 1.9–3.5)
GLUCOSE FLD-MCNC: 125 MG/DL
GLUCOSE SERPL-MCNC: 115 MG/DL (ref 65–99)
GLUCOSE UR STRIP.AUTO-MCNC: NEGATIVE MG/DL
GRAM STN SPEC: NORMAL
GRAM STN SPEC: NORMAL
HCT VFR BLD AUTO: 33 % (ref 42–52)
HGB BLD-MCNC: 11 G/DL (ref 14–18)
HYALINE CASTS #/AREA URNS LPF: ABNORMAL /LPF
IMM GRANULOCYTES # BLD AUTO: 0.02 K/UL (ref 0–0.11)
IMM GRANULOCYTES NFR BLD AUTO: 0.3 % (ref 0–0.9)
INR PPP: 1.29 (ref 0.87–1.13)
KETONES UR STRIP.AUTO-MCNC: NEGATIVE MG/DL
LACTATE SERPL-SCNC: 1.1 MMOL/L (ref 0.5–2)
LDH FLD L TO P-CCNC: 108 U/L
LDH SERPL L TO P-CCNC: 224 U/L (ref 107–266)
LEUKOCYTE ESTERASE UR QL STRIP.AUTO: NEGATIVE
LV EJECT FRACT  99904: 55
LV EJECT FRACT MOD 2C 99903: 51.99
LV EJECT FRACT MOD 4C 99902: 45.05
LV EJECT FRACT MOD BP 99901: 49.95
LYMPHOCYTES # BLD AUTO: 0.93 K/UL (ref 1–4.8)
LYMPHOCYTES NFR BLD: 15.4 % (ref 22–41)
LYMPHOCYTES NFR FLD: 70 %
MAGNESIUM SERPL-MCNC: 1.9 MG/DL (ref 1.5–2.5)
MCH RBC QN AUTO: 32.8 PG (ref 27–33)
MCHC RBC AUTO-ENTMCNC: 33.3 G/DL (ref 32.3–36.5)
MCV RBC AUTO: 98.5 FL (ref 81.4–97.8)
MICRO URNS: ABNORMAL
MONOCYTES # BLD AUTO: 0.63 K/UL (ref 0–0.85)
MONOCYTES NFR BLD AUTO: 10.4 % (ref 0–13.4)
MONOS+MACROS NFR FLD MANUAL: 23 %
NEUTROPHILS # BLD AUTO: 4.29 K/UL (ref 1.82–7.42)
NEUTROPHILS NFR BLD: 71.2 % (ref 44–72)
NEUTROPHILS NFR FLD: 7 %
NITRITE UR QL STRIP.AUTO: NEGATIVE
NRBC # BLD AUTO: 0 K/UL
NRBC BLD-RTO: 0 /100 WBC (ref 0–0.2)
NT-PROBNP SERPL IA-MCNC: 3308 PG/ML (ref 0–125)
NUC CELL # FLD: 714 CELLS/UL
PH FLD: 8 [PH]
PH UR STRIP.AUTO: 6 [PH] (ref 5–8)
PHOSPHATE SERPL-MCNC: 3.4 MG/DL (ref 2.5–4.5)
PLATELET # BLD AUTO: 198 K/UL (ref 164–446)
PMV BLD AUTO: 9.7 FL (ref 9–12.9)
POTASSIUM SERPL-SCNC: 3.7 MMOL/L (ref 3.6–5.5)
PROCALCITONIN SERPL-MCNC: <0.05 NG/ML
PROT FLD-MCNC: 2.3 G/DL
PROT SERPL-MCNC: 6.3 G/DL (ref 6–8.2)
PROT UR QL STRIP: NEGATIVE MG/DL
PROTHROMBIN TIME: 16.2 SEC (ref 12–14.6)
RBC # BLD AUTO: 3.35 M/UL (ref 4.7–6.1)
RBC # FLD: <2000 CELLS/UL
RBC # URNS HPF: ABNORMAL /HPF
RBC UR QL AUTO: ABNORMAL
SIGNIFICANT IND 70042: NORMAL
SITE SITE: NORMAL
SODIUM SERPL-SCNC: 140 MMOL/L (ref 135–145)
SODIUM UR-SCNC: 90 MMOL/L
SOURCE SOURCE: NORMAL
SP GR UR STRIP.AUTO: 1.01
TROPONIN T SERPL-MCNC: 35 NG/L (ref 6–19)
TROPONIN T SERPL-MCNC: 36 NG/L (ref 6–19)
TSH SERPL DL<=0.005 MIU/L-ACNC: 3.56 UIU/ML (ref 0.38–5.33)
UFH PPP CHRO-ACNC: >1.1 IU/ML
UFH PPP CHRO-ACNC: >1.1 IU/ML
UROBILINOGEN UR STRIP.AUTO-MCNC: 0.2 MG/DL
WBC # BLD AUTO: 6 K/UL (ref 4.8–10.8)
WBC #/AREA URNS HPF: ABNORMAL /HPF

## 2024-05-08 PROCEDURE — 700105 HCHG RX REV CODE 258: Performed by: STUDENT IN AN ORGANIZED HEALTH CARE EDUCATION/TRAINING PROGRAM

## 2024-05-08 PROCEDURE — 87205 SMEAR GRAM STAIN: CPT

## 2024-05-08 PROCEDURE — 94640 AIRWAY INHALATION TREATMENT: CPT

## 2024-05-08 PROCEDURE — 36415 COLL VENOUS BLD VENIPUNCTURE: CPT

## 2024-05-08 PROCEDURE — 76775 US EXAM ABDO BACK WALL LIM: CPT

## 2024-05-08 PROCEDURE — 88305 TISSUE EXAM BY PATHOLOGIST: CPT

## 2024-05-08 PROCEDURE — 82550 ASSAY OF CK (CPK): CPT

## 2024-05-08 PROCEDURE — 80053 COMPREHEN METABOLIC PANEL: CPT

## 2024-05-08 PROCEDURE — 83986 ASSAY PH BODY FLUID NOS: CPT

## 2024-05-08 PROCEDURE — 84157 ASSAY OF PROTEIN OTHER: CPT

## 2024-05-08 PROCEDURE — 87086 URINE CULTURE/COLONY COUNT: CPT

## 2024-05-08 PROCEDURE — 770020 HCHG ROOM/CARE - TELE (206)

## 2024-05-08 PROCEDURE — C1729 CATH, DRAINAGE: HCPCS

## 2024-05-08 PROCEDURE — 85652 RBC SED RATE AUTOMATED: CPT

## 2024-05-08 PROCEDURE — 99497 ADVNCD CARE PLAN 30 MIN: CPT | Performed by: STUDENT IN AN ORGANIZED HEALTH CARE EDUCATION/TRAINING PROGRAM

## 2024-05-08 PROCEDURE — 99223 1ST HOSP IP/OBS HIGH 75: CPT | Mod: 25,AI | Performed by: STUDENT IN AN ORGANIZED HEALTH CARE EDUCATION/TRAINING PROGRAM

## 2024-05-08 PROCEDURE — 87116 MYCOBACTERIA CULTURE: CPT

## 2024-05-08 PROCEDURE — 700102 HCHG RX REV CODE 250 W/ 637 OVERRIDE(OP): Performed by: STUDENT IN AN ORGANIZED HEALTH CARE EDUCATION/TRAINING PROGRAM

## 2024-05-08 PROCEDURE — 93010 ELECTROCARDIOGRAM REPORT: CPT | Performed by: INTERNAL MEDICINE

## 2024-05-08 PROCEDURE — 87070 CULTURE OTHR SPECIMN AEROBIC: CPT

## 2024-05-08 PROCEDURE — 87102 FUNGUS ISOLATION CULTURE: CPT

## 2024-05-08 PROCEDURE — 71045 X-RAY EXAM CHEST 1 VIEW: CPT

## 2024-05-08 PROCEDURE — 87015 SPECIMEN INFECT AGNT CONCNTJ: CPT | Mod: 91

## 2024-05-08 PROCEDURE — 84484 ASSAY OF TROPONIN QUANT: CPT

## 2024-05-08 PROCEDURE — 83880 ASSAY OF NATRIURETIC PEPTIDE: CPT

## 2024-05-08 PROCEDURE — 85610 PROTHROMBIN TIME: CPT

## 2024-05-08 PROCEDURE — 83735 ASSAY OF MAGNESIUM: CPT

## 2024-05-08 PROCEDURE — 93005 ELECTROCARDIOGRAM TRACING: CPT | Performed by: STUDENT IN AN ORGANIZED HEALTH CARE EDUCATION/TRAINING PROGRAM

## 2024-05-08 PROCEDURE — 700111 HCHG RX REV CODE 636 W/ 250 OVERRIDE (IP): Mod: JZ | Performed by: STUDENT IN AN ORGANIZED HEALTH CARE EDUCATION/TRAINING PROGRAM

## 2024-05-08 PROCEDURE — 82945 GLUCOSE OTHER FLUID: CPT

## 2024-05-08 PROCEDURE — 700101 HCHG RX REV CODE 250: Performed by: STUDENT IN AN ORGANIZED HEALTH CARE EDUCATION/TRAINING PROGRAM

## 2024-05-08 PROCEDURE — 84443 ASSAY THYROID STIM HORMONE: CPT

## 2024-05-08 PROCEDURE — 83605 ASSAY OF LACTIC ACID: CPT

## 2024-05-08 PROCEDURE — 85730 THROMBOPLASTIN TIME PARTIAL: CPT

## 2024-05-08 PROCEDURE — 82533 TOTAL CORTISOL: CPT

## 2024-05-08 PROCEDURE — 94669 MECHANICAL CHEST WALL OSCILL: CPT

## 2024-05-08 PROCEDURE — 87040 BLOOD CULTURE FOR BACTERIA: CPT

## 2024-05-08 PROCEDURE — 93306 TTE W/DOPPLER COMPLETE: CPT | Mod: 26 | Performed by: INTERNAL MEDICINE

## 2024-05-08 PROCEDURE — 0W9B3ZZ DRAINAGE OF LEFT PLEURAL CAVITY, PERCUTANEOUS APPROACH: ICD-10-PCS | Performed by: STUDENT IN AN ORGANIZED HEALTH CARE EDUCATION/TRAINING PROGRAM

## 2024-05-08 PROCEDURE — 84100 ASSAY OF PHOSPHORUS: CPT

## 2024-05-08 PROCEDURE — 83615 LACTATE (LD) (LDH) ENZYME: CPT

## 2024-05-08 PROCEDURE — 88112 CYTOPATH CELL ENHANCE TECH: CPT

## 2024-05-08 PROCEDURE — 82150 ASSAY OF AMYLASE: CPT

## 2024-05-08 PROCEDURE — 85025 COMPLETE CBC W/AUTO DIFF WBC: CPT

## 2024-05-08 PROCEDURE — 86140 C-REACTIVE PROTEIN: CPT

## 2024-05-08 PROCEDURE — 85520 HEPARIN ASSAY: CPT

## 2024-05-08 PROCEDURE — 87206 SMEAR FLUORESCENT/ACID STAI: CPT

## 2024-05-08 PROCEDURE — A9270 NON-COVERED ITEM OR SERVICE: HCPCS | Performed by: STUDENT IN AN ORGANIZED HEALTH CARE EDUCATION/TRAINING PROGRAM

## 2024-05-08 PROCEDURE — 93306 TTE W/DOPPLER COMPLETE: CPT

## 2024-05-08 PROCEDURE — 82570 ASSAY OF URINE CREATININE: CPT

## 2024-05-08 PROCEDURE — 81001 URINALYSIS AUTO W/SCOPE: CPT

## 2024-05-08 PROCEDURE — 84145 PROCALCITONIN (PCT): CPT

## 2024-05-08 PROCEDURE — 89051 BODY FLUID CELL COUNT: CPT

## 2024-05-08 PROCEDURE — 84300 ASSAY OF URINE SODIUM: CPT

## 2024-05-08 RX ORDER — DILTIAZEM HYDROCHLORIDE 240 MG/1
240 CAPSULE, COATED, EXTENDED RELEASE ORAL DAILY
Status: DISCONTINUED | OUTPATIENT
Start: 2024-05-08 | End: 2024-05-10 | Stop reason: HOSPADM

## 2024-05-08 RX ORDER — BENZONATATE 100 MG/1
200 CAPSULE ORAL ONCE
Status: COMPLETED | OUTPATIENT
Start: 2024-05-08 | End: 2024-05-08

## 2024-05-08 RX ORDER — ZOLPIDEM TARTRATE 5 MG/1
5 TABLET ORAL NIGHTLY PRN
Status: DISCONTINUED | OUTPATIENT
Start: 2024-05-08 | End: 2024-05-10 | Stop reason: HOSPADM

## 2024-05-08 RX ORDER — FUROSEMIDE 10 MG/ML
40 INJECTION INTRAMUSCULAR; INTRAVENOUS
Status: DISCONTINUED | OUTPATIENT
Start: 2024-05-08 | End: 2024-05-09

## 2024-05-08 RX ORDER — LOSARTAN POTASSIUM 50 MG/1
50 TABLET ORAL DAILY
COMMUNITY
Start: 2024-05-03

## 2024-05-08 RX ORDER — ONDANSETRON 2 MG/ML
4 INJECTION INTRAMUSCULAR; INTRAVENOUS EVERY 4 HOURS PRN
Status: DISCONTINUED | OUTPATIENT
Start: 2024-05-08 | End: 2024-05-10 | Stop reason: HOSPADM

## 2024-05-08 RX ORDER — POTASSIUM CHLORIDE 20 MEQ/1
40 TABLET, EXTENDED RELEASE ORAL ONCE
Status: COMPLETED | OUTPATIENT
Start: 2024-05-08 | End: 2024-05-08

## 2024-05-08 RX ORDER — OXYCODONE HYDROCHLORIDE 5 MG/1
5 TABLET ORAL
Status: DISCONTINUED | OUTPATIENT
Start: 2024-05-08 | End: 2024-05-10 | Stop reason: HOSPADM

## 2024-05-08 RX ORDER — HYDRALAZINE HYDROCHLORIDE 20 MG/ML
10 INJECTION INTRAMUSCULAR; INTRAVENOUS EVERY 6 HOURS PRN
Status: DISCONTINUED | OUTPATIENT
Start: 2024-05-08 | End: 2024-05-10 | Stop reason: HOSPADM

## 2024-05-08 RX ORDER — OXYCODONE HYDROCHLORIDE 5 MG/1
2.5 TABLET ORAL
Status: DISCONTINUED | OUTPATIENT
Start: 2024-05-08 | End: 2024-05-10 | Stop reason: HOSPADM

## 2024-05-08 RX ORDER — LABETALOL HYDROCHLORIDE 5 MG/ML
10 INJECTION, SOLUTION INTRAVENOUS EVERY 4 HOURS PRN
Status: DISCONTINUED | OUTPATIENT
Start: 2024-05-08 | End: 2024-05-08

## 2024-05-08 RX ORDER — ACETAMINOPHEN 325 MG/1
650 TABLET ORAL EVERY 6 HOURS PRN
Status: DISCONTINUED | OUTPATIENT
Start: 2024-05-08 | End: 2024-05-10 | Stop reason: HOSPADM

## 2024-05-08 RX ORDER — HEPARIN SODIUM 1000 [USP'U]/ML
3200 INJECTION, SOLUTION INTRAVENOUS; SUBCUTANEOUS PRN
Status: DISCONTINUED | OUTPATIENT
Start: 2024-05-08 | End: 2024-05-08

## 2024-05-08 RX ORDER — POLYETHYLENE GLYCOL 3350 17 G/17G
1 POWDER, FOR SOLUTION ORAL
Status: DISCONTINUED | OUTPATIENT
Start: 2024-05-08 | End: 2024-05-10 | Stop reason: HOSPADM

## 2024-05-08 RX ORDER — DOXYCYCLINE 100 MG/1
100 TABLET ORAL EVERY 12 HOURS
Qty: 10 TABLET | Refills: 0 | Status: DISCONTINUED | OUTPATIENT
Start: 2024-05-08 | End: 2024-05-10 | Stop reason: HOSPADM

## 2024-05-08 RX ORDER — SULFAMETHOXAZOLE AND TRIMETHOPRIM 800; 160 MG/1; MG/1
1 TABLET ORAL 2 TIMES DAILY
Status: ON HOLD | COMMUNITY
Start: 2024-04-03 | End: 2024-05-10

## 2024-05-08 RX ORDER — ATORVASTATIN CALCIUM 40 MG/1
40 TABLET, FILM COATED ORAL EVERY EVENING
Status: DISCONTINUED | OUTPATIENT
Start: 2024-05-08 | End: 2024-05-10 | Stop reason: HOSPADM

## 2024-05-08 RX ORDER — TAMSULOSIN HYDROCHLORIDE 0.4 MG/1
0.4 CAPSULE ORAL 2 TIMES DAILY
Status: DISCONTINUED | OUTPATIENT
Start: 2024-05-08 | End: 2024-05-10 | Stop reason: HOSPADM

## 2024-05-08 RX ORDER — HEPARIN SODIUM 1000 [USP'U]/ML
40 INJECTION, SOLUTION INTRAVENOUS; SUBCUTANEOUS PRN
Status: DISCONTINUED | OUTPATIENT
Start: 2024-05-08 | End: 2024-05-08

## 2024-05-08 RX ORDER — M-VIT,TX,IRON,MINS/CALC/FOLIC 27MG-0.4MG
1 TABLET ORAL DAILY
Status: DISCONTINUED | OUTPATIENT
Start: 2024-05-08 | End: 2024-05-10 | Stop reason: HOSPADM

## 2024-05-08 RX ORDER — PREDNISONE 20 MG/1
40 TABLET ORAL DAILY
Qty: 10 TABLET | Refills: 0 | Status: DISCONTINUED | OUTPATIENT
Start: 2024-05-08 | End: 2024-05-08

## 2024-05-08 RX ORDER — METOPROLOL TARTRATE 1 MG/ML
5 INJECTION, SOLUTION INTRAVENOUS
Status: DISCONTINUED | OUTPATIENT
Start: 2024-05-08 | End: 2024-05-10 | Stop reason: HOSPADM

## 2024-05-08 RX ORDER — MAGNESIUM SULFATE HEPTAHYDRATE 40 MG/ML
2 INJECTION, SOLUTION INTRAVENOUS ONCE
Status: COMPLETED | OUTPATIENT
Start: 2024-05-08 | End: 2024-05-08

## 2024-05-08 RX ORDER — HEPARIN SODIUM 5000 [USP'U]/100ML
0-30 INJECTION, SOLUTION INTRAVENOUS CONTINUOUS
Status: DISCONTINUED | OUTPATIENT
Start: 2024-05-08 | End: 2024-05-08

## 2024-05-08 RX ORDER — ONDANSETRON 4 MG/1
4 TABLET, ORALLY DISINTEGRATING ORAL EVERY 4 HOURS PRN
Status: DISCONTINUED | OUTPATIENT
Start: 2024-05-08 | End: 2024-05-10 | Stop reason: HOSPADM

## 2024-05-08 RX ORDER — MORPHINE SULFATE 4 MG/ML
2 INJECTION INTRAVENOUS
Status: DISCONTINUED | OUTPATIENT
Start: 2024-05-08 | End: 2024-05-10 | Stop reason: HOSPADM

## 2024-05-08 RX ORDER — BENZONATATE 100 MG/1
100 CAPSULE ORAL 3 TIMES DAILY
Status: DISCONTINUED | OUTPATIENT
Start: 2024-05-08 | End: 2024-05-10 | Stop reason: HOSPADM

## 2024-05-08 RX ORDER — AMOXICILLIN 250 MG
2 CAPSULE ORAL EVERY EVENING
Status: DISCONTINUED | OUTPATIENT
Start: 2024-05-08 | End: 2024-05-10 | Stop reason: HOSPADM

## 2024-05-08 RX ORDER — HEPARIN SODIUM 5000 [USP'U]/100ML
INJECTION, SOLUTION INTRAVENOUS CONTINUOUS
Status: DISCONTINUED | OUTPATIENT
Start: 2024-05-08 | End: 2024-05-08

## 2024-05-08 RX ORDER — LABETALOL HYDROCHLORIDE 5 MG/ML
10 INJECTION, SOLUTION INTRAVENOUS EVERY 4 HOURS PRN
Status: DISCONTINUED | OUTPATIENT
Start: 2024-05-08 | End: 2024-05-10 | Stop reason: HOSPADM

## 2024-05-08 RX ORDER — POTASSIUM CHLORIDE 20 MEQ/1
20 TABLET, EXTENDED RELEASE ORAL 2 TIMES DAILY
Status: ON HOLD | COMMUNITY
Start: 2024-05-06 | End: 2024-05-10

## 2024-05-08 RX ORDER — DILTIAZEM HYDROCHLORIDE 300 MG/1
300 CAPSULE, COATED, EXTENDED RELEASE ORAL DAILY
COMMUNITY

## 2024-05-08 RX ORDER — LEVOFLOXACIN 500 MG/1
500 TABLET, FILM COATED ORAL DAILY
Status: ON HOLD | COMMUNITY
Start: 2024-05-04 | End: 2024-05-10

## 2024-05-08 RX ORDER — BENZONATATE 100 MG/1
100 CAPSULE ORAL 3 TIMES DAILY PRN
Status: DISCONTINUED | OUTPATIENT
Start: 2024-05-08 | End: 2024-05-08

## 2024-05-08 RX ADMIN — TAMSULOSIN HYDROCHLORIDE 0.4 MG: 0.4 CAPSULE ORAL at 06:00

## 2024-05-08 RX ADMIN — BENZONATATE 100 MG: 100 CAPSULE ORAL at 11:35

## 2024-05-08 RX ADMIN — FUROSEMIDE 40 MG: 10 INJECTION, SOLUTION INTRAMUSCULAR; INTRAVENOUS at 15:16

## 2024-05-08 RX ADMIN — IPRATROPIUM BROMIDE 0.5 MG: 0.5 SOLUTION RESPIRATORY (INHALATION) at 06:40

## 2024-05-08 RX ADMIN — Medication 1 TABLET: at 06:00

## 2024-05-08 RX ADMIN — DOXYCYCLINE 100 MG: 100 TABLET, FILM COATED ORAL at 17:18

## 2024-05-08 RX ADMIN — AMPICILLIN AND SULBACTAM 3 G: 1; 2 INJECTION, POWDER, FOR SOLUTION INTRAMUSCULAR; INTRAVENOUS at 11:34

## 2024-05-08 RX ADMIN — POTASSIUM CHLORIDE 40 MEQ: 1500 TABLET, EXTENDED RELEASE ORAL at 05:41

## 2024-05-08 RX ADMIN — DILTIAZEM HYDROCHLORIDE 240 MG: 240 CAPSULE, EXTENDED RELEASE ORAL at 05:41

## 2024-05-08 RX ADMIN — AMPICILLIN AND SULBACTAM 3 G: 1; 2 INJECTION, POWDER, FOR SOLUTION INTRAMUSCULAR; INTRAVENOUS at 17:21

## 2024-05-08 RX ADMIN — BENZONATATE 200 MG: 100 CAPSULE ORAL at 01:45

## 2024-05-08 RX ADMIN — BENZONATATE 100 MG: 100 CAPSULE ORAL at 05:41

## 2024-05-08 RX ADMIN — TAMSULOSIN HYDROCHLORIDE 0.4 MG: 0.4 CAPSULE ORAL at 17:18

## 2024-05-08 RX ADMIN — DOXYCYCLINE 100 MG: 100 TABLET, FILM COATED ORAL at 05:41

## 2024-05-08 RX ADMIN — AMPICILLIN AND SULBACTAM 3 G: 1; 2 INJECTION, POWDER, FOR SOLUTION INTRAMUSCULAR; INTRAVENOUS at 05:45

## 2024-05-08 RX ADMIN — PREDNISONE 40 MG: 20 TABLET ORAL at 05:41

## 2024-05-08 RX ADMIN — BENZONATATE 100 MG: 100 CAPSULE ORAL at 17:17

## 2024-05-08 RX ADMIN — ATORVASTATIN CALCIUM 40 MG: 40 TABLET, FILM COATED ORAL at 17:17

## 2024-05-08 RX ADMIN — FUROSEMIDE 40 MG: 10 INJECTION, SOLUTION INTRAMUSCULAR; INTRAVENOUS at 02:44

## 2024-05-08 RX ADMIN — MAGNESIUM SULFATE HEPTAHYDRATE 2 G: 2 INJECTION, SOLUTION INTRAVENOUS at 05:47

## 2024-05-08 ASSESSMENT — ENCOUNTER SYMPTOMS
PALPITATIONS: 0
MYALGIAS: 0
SPUTUM PRODUCTION: 1
WEAKNESS: 1
ORTHOPNEA: 1
DIZZINESS: 0
CHILLS: 1
SHORTNESS OF BREATH: 1
ABDOMINAL PAIN: 0
BRUISES/BLEEDS EASILY: 0
NAUSEA: 0
WHEEZING: 1
COUGH: 1
BLURRED VISION: 0
HEARTBURN: 1
VOMITING: 0
FOCAL WEAKNESS: 0
HEADACHES: 0
HEMOPTYSIS: 0
DOUBLE VISION: 0
FEVER: 1
DEPRESSION: 0

## 2024-05-08 ASSESSMENT — COPD QUESTIONNAIRES
COPD SCREENING SCORE: 3
DO YOU EVER COUGH UP ANY MUCUS OR PHLEGM?: NO/ONLY WITH OCCASIONAL COLDS OR INFECTIONS
DURING THE PAST 4 WEEKS HOW MUCH DID YOU FEEL SHORT OF BREATH: SOME OF THE TIME
HAVE YOU SMOKED AT LEAST 100 CIGARETTES IN YOUR ENTIRE LIFE: NO/DON'T KNOW

## 2024-05-08 ASSESSMENT — LIFESTYLE VARIABLES
SUBSTANCE_ABUSE: 0
ALCOHOL_USE: NO

## 2024-05-08 ASSESSMENT — COGNITIVE AND FUNCTIONAL STATUS - GENERAL
SUGGESTED CMS G CODE MODIFIER DAILY ACTIVITY: CJ
DAILY ACTIVITIY SCORE: 22
DRESSING REGULAR UPPER BODY CLOTHING: A LITTLE
PERSONAL GROOMING: A LITTLE

## 2024-05-08 ASSESSMENT — PATIENT HEALTH QUESTIONNAIRE - PHQ9
1. LITTLE INTEREST OR PLEASURE IN DOING THINGS: NOT AT ALL
SUM OF ALL RESPONSES TO PHQ9 QUESTIONS 1 AND 2: 0
2. FEELING DOWN, DEPRESSED, IRRITABLE, OR HOPELESS: NOT AT ALL

## 2024-05-08 ASSESSMENT — PAIN DESCRIPTION - PAIN TYPE
TYPE: ACUTE PAIN
TYPE: ACUTE PAIN

## 2024-05-08 ASSESSMENT — FIBROSIS 4 INDEX: FIB4 SCORE: 2.36

## 2024-05-08 NOTE — PROGRESS NOTES
Med rec complete per pt's wife.  Allergies reviewed       Has patient had any outside antibiotics in the last 30 days? Y    Any Anticoagulants (rivaroxaban, apixaban, edoxaban, dabigatran, warfarin, enoxaparin) taken in the last 14 days? Y  Anticoagulant name: Eliquis, Last dose: 05/07/24 @08:00 am.        Pharmacy/Pharmacies Pt utilizes : [facility] [phone]

## 2024-05-08 NOTE — PROGRESS NOTES
Telemetry Shift Summary    Rhythm AFib  HR Range 75-81  Ectopy PVC   Measurements -/.07/-        Normal Values  Rhythm SR  HR Range    Measurements 0.12-0.20 / 0.06-0.10  / 0.30-0.52

## 2024-05-08 NOTE — PROGRESS NOTES
Admitted today with acute respiratory failure with hypoxia, volume overload, possible pneumonia, left-sided pleural effusion.  Known history of atrial fibrillation on anticoagulation with Eliquis.  Started on empiric coverage with IV Unasyn, for ultrasound-guided thoracenteses.  Also on IV Lasix for diuresis.  Updates given and plan of care discussed with patient's spouse who was at bedside.

## 2024-05-08 NOTE — CARE PLAN
Problem: Bronchoconstriction  Goal: Improve in air movement and diminished wheezing  Description: Target End Date:  2 to 3 days    1.  Implement inhaled treatments  2.  Evaluate and manage medication effects  5/8/2024 0348 by Sarah Lewis, RRT  Outcome: Progressing  5/8/2024 0346 by Sarah Lewis, RRT  Outcome: Progressing   Flutter QID

## 2024-05-08 NOTE — PROGRESS NOTES
4 Eyes Skin Assessment Completed by ENMANUEL Liu and ENMANUEL Amaya.    Head WDL  Ears WDL  Nose WDL  Mouth WDL  Neck WDL  Breast/Chest WDL  Shoulder Blades WDL  Spine WDL  (R) Arm/Elbow/Hand WDL  (L) Arm/Elbow/Hand WDL  Abdomen WDL  Groin WDL  Scrotum/Coccyx/Buttocks WDL  (R) Leg WDL  (L) Leg WDL  (R) Heel/Foot/Toe WDL  (L) Heel/Foot/Toe WDL          Devices In Places Tele Box, Blood Pressure Cuff, and Pulse Ox      Interventions In Place N/A    Possible Skin Injury No    Pictures Uploaded Into Epic N/A  Wound Consult Placed N/A  RN Wound Prevention Protocol Ordered No

## 2024-05-08 NOTE — H&P
Hospital Medicine History & Physical Note    Date of Service  5/8/2024    Primary Care Physician  Pcp Pt States None    Consultants  None    Code Status  DNAR/DNI    Chief Complaint  No chief complaint on file.  Shortness of breath, cough    History of Presenting Illness  Miguel Reyna is a 89 y.o. male with history of chronic A-fib on Eliquis, CAD prior PCI, prostate cancer on abiraterone and prednisone, who presented 5/8/2024 as direct admit from [facility], needing higher level of care. Patient resides in [place], was visiting [facility], reported to have increased shortness of breath for 2 weeks.  Also noted to have fever, chills, cough.  He has been taking Levaquin for UTI since 5/4.  In ER, noted to have desaturation 86%, /99.  He was thought to have clinical suspicion of CHF exacerbation, referred to [facility] as direct admission for high-level care.  Patient was given nitroglycerin 0.4 mg sublingual, Lasix 40 mg IV prior to transfer.    Workup from [facility] ER included:  CBC: BBC 4.7, hemoglobin 12.7, platelet 195  CMP: Glucose 115, BUN 31, creatinine 1.5, calcium 8.9, bicarb 26, chloride 105, sodium 140, potassium 4.3, alkaline phosphatase 109, AST 24, ALT 25, total protein 7.0, albumin 3.0  Troponin I 27 (ULN 36)  proBNP 283  Negative COVID/influenza A&B    CXR: Interstitial prominence may reflect mild interstitial edema.  Moderate left-sided pleural effusion.  Patient was seen by me at [facility] arrival, admitted to medicine service for further evaluation and treatment.    I discussed the plan of care with patient, family, bedside RN, and pharmacy.    Review of Systems  Review of Systems   Constitutional:  Positive for chills, fever and malaise/fatigue.   HENT:  Negative for hearing loss and tinnitus.    Eyes:  Negative for blurred vision and double vision.   Respiratory:  Positive for cough, sputum production, shortness of breath and wheezing. Negative for hemoptysis.     Cardiovascular:  Positive for orthopnea. Negative for chest pain, palpitations and leg swelling.   Gastrointestinal:  Positive for heartburn. Negative for abdominal pain, nausea and vomiting.   Genitourinary:  Negative for dysuria and urgency.   Musculoskeletal:  Negative for joint pain and myalgias.   Skin:  Negative for rash.   Neurological:  Positive for weakness. Negative for dizziness, focal weakness and headaches.   Endo/Heme/Allergies:  Negative for environmental allergies. Does not bruise/bleed easily.   Psychiatric/Behavioral:  Negative for depression and substance abuse.    All other systems reviewed and are negative.      Past Medical History   has a past medical history of Cancer (HCC), Myocardial infarct (HCC) (9/24/2011), and Prostate CA (HCC) (01/01/2018).    Surgical History   has a past surgical history that includes other orthopedic surgery (1988); turp-vapor (10/01/2012); and irrigation & debridement ortho (Left, 6/30/2018).     Family History  family history includes Heart Disease in his father and mother.   Family history reviewed with patient. There is family history that is pertinent to the chief complaint.     Social History   reports that he has never smoked. He has never used smokeless tobacco. He reports current alcohol use. He reports that he does not use drugs.    Allergies  No Known Allergies    Medications  Prior to Admission Medications   Prescriptions Last Dose Informant Patient Reported? Taking?   ATORVASTATIN CALCIUM PO   Yes No   Sig: Take  by mouth.   Abiraterone Acetate 250 MG Tab   Yes No   Sig: Take 1,000 mg by mouth every day.   DILTIAZem CD (CARTIA XT) 240 MG CAPSULE SR 24 HR   Yes No   Sig: Take 240 mg by mouth every day.   PEG 3350-KCl-NaBcb-NaCl-NaSulf (PEG-3350/ELECTROLYTES) 236 g Recon Soln   Yes No   Sig: Drink 1st portion of prep at 6 PM the evening before. 2nd portion must be started 3 hours before and finished 2 hours prior to report time   apixaban (ELIQUIS)  2.5mg Tab   Yes No   Sig: Take 2.5 mg by mouth.   ascorbic acid (ASCORBIC ACID) 500 MG TABS   Yes No   Sig: Take 500 mg by mouth every day.   atorvastatin (LIPITOR) 40 MG Tab   Yes No   Sig: Take 40 mg by mouth every day.   nitroglycerin (NITROSTAT) 0.4 MG SL Tab   Yes No   Sig: Place 0.4 mg under the tongue every 5 minutes as needed for Chest Pain.   predniSONE (DELTASONE) 5 MG Tab   Yes No   Sig: Take 5 mg by mouth every day.   rivaroxaban (XARELTO) 20 MG Tab tablet   Yes No   Sig: Take 20 mg by mouth with dinner.   therapeutic multivitamin-minerals (THERAGRAN-M) TABS   Yes No   Sig: Take 1 Tab by mouth every day.      Facility-Administered Medications: None       Physical Exam  Temp:  [36.2 °C (97.2 °F)] 36.2 °C (97.2 °F)  Pulse:  [98] 98  Resp:  [18] 18  BP: (176)/(80) 176/80  SpO2:  [94 %-95 %] 95 %                          Physical Exam  Vitals and nursing note reviewed.   Constitutional:       General: He is not in acute distress.     Appearance: He is ill-appearing.   HENT:      Head: Normocephalic and atraumatic.      Nose: Nose normal.      Mouth/Throat:      Mouth: Mucous membranes are moist.      Pharynx: Oropharynx is clear.   Eyes:      General: No scleral icterus.     Extraocular Movements: Extraocular movements intact.   Cardiovascular:      Rate and Rhythm: Normal rate. Rhythm irregular.   Pulmonary:      Effort: No respiratory distress.      Breath sounds: No stridor. Wheezing (mild, end expiratory) and rales present.      Comments: Inspiratory crackles (L base)  Chest:      Chest wall: No tenderness.   Abdominal:      General: There is distension.      Tenderness: There is no abdominal tenderness. There is no guarding or rebound.   Genitourinary:     Comments: Wahl present (placed prior to transfer)  Musculoskeletal:         General: Normal range of motion.      Cervical back: Neck supple. No tenderness.      Right lower leg: No edema.      Left lower leg: No edema.   Skin:     General: Skin is  warm and dry.      Capillary Refill: Capillary refill takes less than 2 seconds.   Neurological:      General: No focal deficit present.      Mental Status: He is alert and oriented to person, place, and time.   Psychiatric:         Mood and Affect: Mood normal.         Laboratory:  Recent Labs     05/08/24 0204   WBC 6.0   RBC 3.35*   HEMOGLOBIN 11.0*   HEMATOCRIT 33.0*   MCV 98.5*   MCH 32.8   MCHC 33.3   RDW 50.4*   PLATELETCT 198   MPV 9.7     Recent Labs     05/08/24 0204   SODIUM 140   POTASSIUM 3.7   CHLORIDE 105   CO2 24   GLUCOSE 115*   BUN 28*   CREATININE 1.17   CALCIUM 9.0     Recent Labs     05/08/24 0204   ALTSGPT 16   ASTSGOT 21   ALKPHOSPHAT 89   TBILIRUBIN 0.5   GLUCOSE 115*     Recent Labs     05/08/24 0204   APTT 26.4   INR 1.29*     Recent Labs     05/08/24 0204   NTPROBNP 3308*         Recent Labs     05/08/24 0204   TROPONINT 35*       Imaging:  DX-CHEST-LIMITED (1 VIEW)   Final Result         1.  Pulmonary edema and/or infiltrates, decreased since prior.   2.  Small bilateral pleural effusion   3.  Cardiomegaly   4.  Atherosclerosis      OUTSIDE IMAGES-DX CHEST   Final Result      EC-ECHOCARDIOGRAM COMPLETE W/O CONT    (Results Pending)   US-THORACENTESIS PUNCTURE LEFT    (Results Pending)             X-Ray:  I have personally reviewed the images and compared with prior images.  EKG:  I have personally reviewed the images and compared with prior images.    Assessment/Plan:  Justification for Admission Status  I anticipate this patient will require at least 2 midnights hospitalization, therefore appropriate for inpatient status.      * Congestive heart failure (CHF) ([facility])  Assessment & Plan  Suspected new onset  Diuresis as tolerated -- lasix 40mg IV BID  Optimize CHF meds as able to tolerate  Check echo    Acute respiratory failure with hypoxia ([facility])  Assessment & Plan  Multifactorial: CHF, fluid overload with  pleural effusion, CAP  Diuresis as tolerated  Antibiotic  Pulmonary  hygiene  Check echo  -Doubt DVT/PE as he has been on Eliquis    Acute urinary retention  Assessment & Plan  Wahl placed from transferring facility (present at admission)  Flomax  Void challenge when appropriate    Cardiorenal syndrome  Assessment & Plan  Suspected  Monitor renal function while on diuretic  Cr already improved 1.5 (before transfer) --> 1.17  Minimize nephrotoxic agents, renally dose meds    Pleural effusion  Assessment & Plan  Bilateral pleural effusions (L>R) noted on CXR  Diuresis  Ultrasound-guided thoracocentesis ordered, left    UTI (urinary tract infection)  Assessment & Plan  Was taking Levaquin previously  Currently on antibiotic as noted in Community acquired pneumonia  Follow cultures    CAP (community acquired pneumonia)  Assessment & Plan  Follow cultures  Antibiotic: Unasyn, doxycycline    Reactive airway disease  Assessment & Plan  Nebs, pulmonary hygiene  Prednisone daily    Primary malignant neoplasm of prostate (HCC)- (present on admission)  Assessment & Plan  Per history  Was taking abiraterone and prednisone  -Continue prednisone for now    CAD (coronary artery disease)- (present on admission)  Assessment & Plan  Per history, prior PCI  Optimize CAD meds    Atrial fibrillation (HCC)- (present on admission)  Assessment & Plan  Chronic A-fib  Continue diltiazem CD  -Eliquis held in case invasive intervention, resume when appropriate  -Heparin drip for now    Hypercholesteremia- (present on admission)  Assessment & Plan  Atorvastatin    ACP (advance care planning)  Assessment & Plan  Goal care discussed with patient and patient's spouse at bedside in T7.  Patient stated he does not wish to have aggressive/heroic life-sustaining measures-no CPR/defibrillation/intubation or mechanical ventilation.  Patient and wife reported patient already has DNR/DNI POLST already established.  Patient is however agreeable for IV diuretic, antibiotic treatment, breathing treatment, as well as further  diagnostic workup and treatment of suspected CHF exacerbation.  Diagnosis, prognosis, question and concern addressed.  DNR/DNI status confirmed.  ACP: 16 minutes        VTE prophylaxis: therapeutic anticoagulation with heparin drip

## 2024-05-08 NOTE — ASSESSMENT & PLAN NOTE
C/o left rib pain 4/10 - started last night - denies recent trauma or injury - woke up this morning with same pain - denies cough, fever or SOB - negative COVID screening - hasn't tried any OTC - appt booked today with Dr Martin.   History of stents  Lipitor

## 2024-05-08 NOTE — PROGRESS NOTES
[facility]PITAL[facility] TRIAGE OFFICER DIRECT ADMISSION REPORT  Transferring facility: [facility]  Transferring physician: Dr. Oh  Transferring facility/physician contact number: [facility]  Chief complaint: shortness of breath   Pertinent history & patient course: 89-year-old male history of prostate cancer, atrial fibrillation on Eliquis lives in [place] visits [place] in the summer with wife presents with oxygen saturation 86% and blood pressure 200/100.  Patient was given nitroglycerin x 2 with improvement in blood pressure.  Transferred here to rule out new onset CHF.  Pertinent imaging & lab results:    CXR effusion left chest, trop normal     Code Status: Full code  Further work up or recommendations per triage officer prior to transfer: None  Consultants called prior to transfer and pertinent input from consultants: None  Patient accepted for transfer: Yes  Consultants to be called upon arrival: none   Admission status: Inpatient.   Floor requested: tele  If ICU transfer, name of intensivist case discussed with and pertinent input from critical care: none     Please inform the triage officer upon arrival of the patient to [facility] for assignment of a hospitalist to perform admission.      For any question or concerns regarding the care of this patient, please reach out to the assigned hospitalist.

## 2024-05-08 NOTE — CARE PLAN
The patient is Stable - Low risk of patient condition declining or worsening    Shift Goals  Clinical Goals: Monitor labs and VS  Patient Goals: Get better  Family Goals: Bet better    Progress made toward(s) clinical / shift goals: Pt had thoracentesis today 900cc of fluid was taken. Oxygen titrated from 2L NC to 1L. Pt said his breathing feels better. US of Kidney and Echo completed.   Problem: Knowledge Deficit - Standard  Goal: Patient and family/care givers will demonstrate understanding of plan of care, disease process/condition, diagnostic tests and medications  Outcome: Progressing     Problem: Respiratory  Goal: Patient will achieve/maintain optimum respiratory ventilation and gas exchange  Outcome: Progressing     Problem: Fall Risk  Goal: Patient will remain free from falls  Outcome: Progressing

## 2024-05-08 NOTE — PROGRESS NOTES
Bedside report received from jenae Liu RN. Patient and spouse updated on plan of care. Patient sitting up in chair. No pain reported. Call light and personal belongings within reach.Fall precautions in place. Will continue to monitor.

## 2024-05-08 NOTE — ASSESSMENT & PLAN NOTE
Was taking Levaquin previously  Currently on antibiotic as noted in Community acquired pneumonia  Follow cultures

## 2024-05-08 NOTE — CARE PLAN
The patient is Stable - Low risk of patient condition declining or worsening         Progress made toward(s) clinical / shift goals:    Problem: Knowledge Deficit - Standard  Goal: Patient and family/care givers will demonstrate understanding of plan of care, disease process/condition, diagnostic tests and medications  Outcome: Progressing     Problem: Care Map:  Admission Optimal Outcome for the Heart Failure Patient  Goal: Admission:  Optimal Care of the heart failure patient  Outcome: Progressing       Patient is not progressing towards the following goals:

## 2024-05-08 NOTE — ASSESSMENT & PLAN NOTE
Ultrasound guided left sided diagnostic and therapeutic thoracentesis, 900 mL of fluid withdrawn  IV Lasix for diuresis  Follow fluid culture and cytology  Repeat chest x-ray tomorrow

## 2024-05-08 NOTE — ASSESSMENT & PLAN NOTE
Suspected  Monitor renal function while on diuretic  Cr already improved 1.5 (before transfer) --> 1.17  Minimize nephrotoxic agents, renally dose meds

## 2024-05-08 NOTE — CARE PLAN
Problem: Bronchoconstriction  Goal: Improve in air movement and diminished wheezing  Description: Target End Date:  2 to 3 days    1.  Implement inhaled treatments  2.  Evaluate and manage medication effects  Outcome: Progressing   Atrovent [id number]

## 2024-05-08 NOTE — ASSESSMENT & PLAN NOTE
Goal care discussed with patient and patient's spouse at bedside in T7.  Patient stated he does not wish to have aggressive/heroic life-sustaining measures-no CPR/defibrillation/intubation or mechanical ventilation.  Patient and wife reported patient already has DNR/DNI POLST already established.  Patient is however agreeable for IV diuretic, antibiotic treatment, breathing treatment, as well as further diagnostic workup and treatment of suspected CHF exacerbation.  Diagnosis, prognosis, question and concern addressed.  DNR/DNI status confirmed.  ACP: 16 minutes

## 2024-05-08 NOTE — PROGRESS NOTES
Lab called at 9.18 am for critical result of antiXa greater then 1.1. Provider SAYRA Barcenas notified.

## 2024-05-09 PROBLEM — N18.31 STAGE 3A CHRONIC KIDNEY DISEASE: Status: ACTIVE | Noted: 2024-05-08

## 2024-05-09 LAB
ANION GAP SERPL CALC-SCNC: 11 MMOL/L (ref 7–16)
BLOOD CULTURE HOLD CXBCH: NORMAL
BLOOD CULTURE HOLD CXBCH: NORMAL
BUN SERPL-MCNC: 30 MG/DL (ref 8–22)
CALCIUM SERPL-MCNC: 8.7 MG/DL (ref 8.5–10.5)
CHLORIDE SERPL-SCNC: 107 MMOL/L (ref 96–112)
CO2 SERPL-SCNC: 27 MMOL/L (ref 20–33)
CREAT SERPL-MCNC: 1.34 MG/DL (ref 0.5–1.4)
CYTOLOGY REG CYTOL: NORMAL
ERYTHROCYTE [DISTWIDTH] IN BLOOD BY AUTOMATED COUNT: 48.9 FL (ref 35.9–50)
EST. AVERAGE GLUCOSE BLD GHB EST-MCNC: 120 MG/DL
GFR SERPLBLD CREATININE-BSD FMLA CKD-EPI: 51 ML/MIN/1.73 M 2
GLUCOSE SERPL-MCNC: 109 MG/DL (ref 65–99)
HBA1C MFR BLD: 5.8 % (ref 4–5.6)
HCT VFR BLD AUTO: 33.6 % (ref 42–52)
HGB BLD-MCNC: 11.5 G/DL (ref 14–18)
MAGNESIUM SERPL-MCNC: 1.9 MG/DL (ref 1.5–2.5)
MCH RBC QN AUTO: 32.9 PG (ref 27–33)
MCHC RBC AUTO-ENTMCNC: 34.2 G/DL (ref 32.3–36.5)
MCV RBC AUTO: 96 FL (ref 81.4–97.8)
MYCOBACTERIUM SPEC CULT: NORMAL
NT-PROBNP SERPL IA-MCNC: 2509 PG/ML (ref 0–125)
PLATELET # BLD AUTO: 198 K/UL (ref 164–446)
PMV BLD AUTO: 9.6 FL (ref 9–12.9)
POTASSIUM SERPL-SCNC: 3.8 MMOL/L (ref 3.6–5.5)
PROCALCITONIN SERPL-MCNC: <0.05 NG/ML
PTH-INTACT SERPL-MCNC: 109 PG/ML (ref 14–72)
RBC # BLD AUTO: 3.5 M/UL (ref 4.7–6.1)
RHODAMINE-AURAMINE STN SPEC: NORMAL
RHODAMINE-AURAMINE STN SPEC: NORMAL
SIGNIFICANT IND 70042: NORMAL
SIGNIFICANT IND 70042: NORMAL
SITE SITE: NORMAL
SITE SITE: NORMAL
SODIUM SERPL-SCNC: 145 MMOL/L (ref 135–145)
SOURCE SOURCE: NORMAL
SOURCE SOURCE: NORMAL
VIT B12 SERPL-MCNC: 676 PG/ML (ref 211–911)
WBC # BLD AUTO: 5.4 K/UL (ref 4.8–10.8)

## 2024-05-09 PROCEDURE — 770020 HCHG ROOM/CARE - TELE (206)

## 2024-05-09 PROCEDURE — 700111 HCHG RX REV CODE 636 W/ 250 OVERRIDE (IP): Performed by: STUDENT IN AN ORGANIZED HEALTH CARE EDUCATION/TRAINING PROGRAM

## 2024-05-09 PROCEDURE — 84145 PROCALCITONIN (PCT): CPT

## 2024-05-09 PROCEDURE — 36415 COLL VENOUS BLD VENIPUNCTURE: CPT

## 2024-05-09 PROCEDURE — 83036 HEMOGLOBIN GLYCOSYLATED A1C: CPT

## 2024-05-09 PROCEDURE — 700111 HCHG RX REV CODE 636 W/ 250 OVERRIDE (IP): Mod: JZ | Performed by: FAMILY MEDICINE

## 2024-05-09 PROCEDURE — 83970 ASSAY OF PARATHORMONE: CPT

## 2024-05-09 PROCEDURE — 97535 SELF CARE MNGMENT TRAINING: CPT

## 2024-05-09 PROCEDURE — 85027 COMPLETE CBC AUTOMATED: CPT

## 2024-05-09 PROCEDURE — A9270 NON-COVERED ITEM OR SERVICE: HCPCS | Performed by: STUDENT IN AN ORGANIZED HEALTH CARE EDUCATION/TRAINING PROGRAM

## 2024-05-09 PROCEDURE — 700105 HCHG RX REV CODE 258: Performed by: STUDENT IN AN ORGANIZED HEALTH CARE EDUCATION/TRAINING PROGRAM

## 2024-05-09 PROCEDURE — 82607 VITAMIN B-12: CPT

## 2024-05-09 PROCEDURE — 80048 BASIC METABOLIC PNL TOTAL CA: CPT

## 2024-05-09 PROCEDURE — 83735 ASSAY OF MAGNESIUM: CPT

## 2024-05-09 PROCEDURE — 99233 SBSQ HOSP IP/OBS HIGH 50: CPT | Performed by: FAMILY MEDICINE

## 2024-05-09 PROCEDURE — 700102 HCHG RX REV CODE 250 W/ 637 OVERRIDE(OP): Performed by: STUDENT IN AN ORGANIZED HEALTH CARE EDUCATION/TRAINING PROGRAM

## 2024-05-09 PROCEDURE — 83880 ASSAY OF NATRIURETIC PEPTIDE: CPT

## 2024-05-09 RX ORDER — FUROSEMIDE 10 MG/ML
40 INJECTION INTRAMUSCULAR; INTRAVENOUS
Status: DISCONTINUED | OUTPATIENT
Start: 2024-05-10 | End: 2024-05-10 | Stop reason: HOSPADM

## 2024-05-09 RX ORDER — ENOXAPARIN SODIUM 100 MG/ML
40 INJECTION SUBCUTANEOUS DAILY
Status: DISCONTINUED | OUTPATIENT
Start: 2024-05-09 | End: 2024-05-10 | Stop reason: HOSPADM

## 2024-05-09 RX ORDER — MAGNESIUM SULFATE HEPTAHYDRATE 40 MG/ML
2 INJECTION, SOLUTION INTRAVENOUS ONCE
Status: COMPLETED | OUTPATIENT
Start: 2024-05-09 | End: 2024-05-09

## 2024-05-09 RX ADMIN — FUROSEMIDE 40 MG: 10 INJECTION, SOLUTION INTRAMUSCULAR; INTRAVENOUS at 05:15

## 2024-05-09 RX ADMIN — TAMSULOSIN HYDROCHLORIDE 0.4 MG: 0.4 CAPSULE ORAL at 17:29

## 2024-05-09 RX ADMIN — DOXYCYCLINE 100 MG: 100 TABLET, FILM COATED ORAL at 17:28

## 2024-05-09 RX ADMIN — MAGNESIUM SULFATE HEPTAHYDRATE 2 G: 2 INJECTION, SOLUTION INTRAVENOUS at 09:27

## 2024-05-09 RX ADMIN — BENZONATATE 100 MG: 100 CAPSULE ORAL at 11:53

## 2024-05-09 RX ADMIN — ATORVASTATIN CALCIUM 40 MG: 40 TABLET, FILM COATED ORAL at 17:27

## 2024-05-09 RX ADMIN — TAMSULOSIN HYDROCHLORIDE 0.4 MG: 0.4 CAPSULE ORAL at 05:15

## 2024-05-09 RX ADMIN — DILTIAZEM HYDROCHLORIDE 240 MG: 240 CAPSULE, EXTENDED RELEASE ORAL at 05:15

## 2024-05-09 RX ADMIN — Medication 1 TABLET: at 05:15

## 2024-05-09 RX ADMIN — BENZONATATE 100 MG: 100 CAPSULE ORAL at 05:15

## 2024-05-09 RX ADMIN — AMPICILLIN AND SULBACTAM 3 G: 1; 2 INJECTION, POWDER, FOR SOLUTION INTRAMUSCULAR; INTRAVENOUS at 23:52

## 2024-05-09 RX ADMIN — AMPICILLIN AND SULBACTAM 3 G: 1; 2 INJECTION, POWDER, FOR SOLUTION INTRAMUSCULAR; INTRAVENOUS at 11:53

## 2024-05-09 RX ADMIN — AMPICILLIN AND SULBACTAM 3 G: 1; 2 INJECTION, POWDER, FOR SOLUTION INTRAMUSCULAR; INTRAVENOUS at 17:31

## 2024-05-09 RX ADMIN — AMPICILLIN AND SULBACTAM 3 G: 1; 2 INJECTION, POWDER, FOR SOLUTION INTRAMUSCULAR; INTRAVENOUS at 00:33

## 2024-05-09 RX ADMIN — ENOXAPARIN SODIUM 40 MG: 100 INJECTION SUBCUTANEOUS at 17:29

## 2024-05-09 RX ADMIN — DOXYCYCLINE 100 MG: 100 TABLET, FILM COATED ORAL at 05:15

## 2024-05-09 RX ADMIN — BENZONATATE 100 MG: 100 CAPSULE ORAL at 17:26

## 2024-05-09 RX ADMIN — AMPICILLIN AND SULBACTAM 3 G: 1; 2 INJECTION, POWDER, FOR SOLUTION INTRAMUSCULAR; INTRAVENOUS at 05:19

## 2024-05-09 ASSESSMENT — COGNITIVE AND FUNCTIONAL STATUS - GENERAL
SUGGESTED CMS G CODE MODIFIER DAILY ACTIVITY: CH
SUGGESTED CMS G CODE MODIFIER MOBILITY: CH
DAILY ACTIVITIY SCORE: 24
MOBILITY SCORE: 24

## 2024-05-09 ASSESSMENT — ENCOUNTER SYMPTOMS
HEADACHES: 0
BACK PAIN: 0
WEAKNESS: 1
MYALGIAS: 0
VOMITING: 0
HEARTBURN: 0
BLURRED VISION: 0
NERVOUS/ANXIOUS: 1
FOCAL WEAKNESS: 0
ABDOMINAL PAIN: 0
DIAPHORESIS: 0
FLANK PAIN: 0
SHORTNESS OF BREATH: 0
SENSORY CHANGE: 0
DIARRHEA: 0
NAUSEA: 0
PALPITATIONS: 0
SPEECH CHANGE: 0
FEVER: 0
CHILLS: 0
COUGH: 1
DIZZINESS: 0
SORE THROAT: 0
NECK PAIN: 0

## 2024-05-09 ASSESSMENT — PAIN DESCRIPTION - PAIN TYPE: TYPE: ACUTE PAIN

## 2024-05-09 ASSESSMENT — FIBROSIS 4 INDEX: FIB4 SCORE: 2.36

## 2024-05-09 ASSESSMENT — ACTIVITIES OF DAILY LIVING (ADL): TOILETING: INDEPENDENT

## 2024-05-09 NOTE — PROGRESS NOTES
Hospital Medicine Daily Progress Note    Date of Service  5/9/2024    Chief Complaint  Miguel Reyna is a 89 y.o. male admitted 5/8/2024 with pleural effusion    Hospital Course  Admitted with bilateral pleural effusion, left greater than right, pulmonary edema, possible pneumonia.  He was started on IV Lasix for diuresis, IV Unasyn and doxycycline.  Patient with known history of persistent atrial fibrillation, he was on anticoagulation with Eliquis which was held.  Ultrasound-guided thoracentesis was done on the left pleural effusion.      Interval Problem Update  Pleural effusion -fluid culture negative so far, cytology pending  Pneumonia -feels better  Respiratory failure - O2 1 lpm  A-fib - rate 77-92  HTN - sbp 113-146  CKD - PTH elevated, renal ultrasound reviewed  Low magnesium    Updates given and plan of care discussed with patient's spouse who was at bedside.    I have discussed this patient's plan of care and discharge plan at IDT rounds today with Case Management, Nursing, Nursing leadership, and other members of the IDT team.    Consultants/Specialty  None    Code Status  Full Code    Disposition  The patient is not medically cleared for discharge to home or a post-acute facility.  Anticipate discharge to: home with close outpatient follow-up    I have placed the appropriate orders for post-discharge needs.    Review of Systems  Review of Systems   Constitutional:  Negative for chills, diaphoresis, fever and malaise/fatigue.   HENT:  Negative for congestion, hearing loss and sore throat.    Eyes:  Negative for blurred vision.   Respiratory:  Positive for cough. Negative for shortness of breath.    Cardiovascular:  Negative for chest pain, palpitations and leg swelling.   Gastrointestinal:  Negative for abdominal pain, diarrhea, heartburn, nausea and vomiting.   Genitourinary:  Negative for dysuria, flank pain and hematuria.   Musculoskeletal:  Negative for back pain, joint pain, myalgias and neck  pain.   Skin:  Negative for rash.   Neurological:  Positive for weakness. Negative for dizziness, sensory change, speech change, focal weakness and headaches.   Psychiatric/Behavioral:  The patient is nervous/anxious.         Physical Exam  Temp:  [36.1 °C (97 °F)-36.9 °C (98.4 °F)] 36.9 °C (98.4 °F)  Pulse:  [77-92] 82  Resp:  [18] 18  BP: (113-146)/(65-81) 124/81  SpO2:  [90 %-98 %] 92 %    Physical Exam  Vitals and nursing note reviewed.   HENT:      Head: Normocephalic and atraumatic.      Nose: No congestion.      Mouth/Throat:      Mouth: Mucous membranes are moist.   Eyes:      Extraocular Movements: Extraocular movements intact.      Conjunctiva/sclera: Conjunctivae normal.   Cardiovascular:      Rate and Rhythm: Normal rate. Rhythm irregular.      Heart sounds: Murmur heard.   Pulmonary:      Effort: Pulmonary effort is normal.      Breath sounds: Examination of the right-lower field reveals decreased breath sounds. Examination of the left-lower field reveals decreased breath sounds. Decreased breath sounds and rales present.   Abdominal:      General: There is no distension.      Tenderness: There is no abdominal tenderness. There is no guarding or rebound.   Musculoskeletal:      Cervical back: No tenderness.      Right lower leg: No edema.      Left lower leg: No edema.   Skin:     General: Skin is warm and dry.   Neurological:      General: No focal deficit present.      Mental Status: He is alert and oriented to person, place, and time.      Cranial Nerves: No cranial nerve deficit.   Psychiatric:         Mood and Affect: Mood is anxious.         Fluids    Intake/Output Summary (Last 24 hours) at 5/9/2024 1220  Last data filed at 5/9/2024 0514  Gross per 24 hour   Intake 900 ml   Output 1550 ml   Net -650 ml       Laboratory  Recent Labs     05/08/24  0204 05/09/24  0124   WBC 6.0 5.4   RBC 3.35* 3.50*   HEMOGLOBIN 11.0* 11.5*   HEMATOCRIT 33.0* 33.6*   MCV 98.5* 96.0   MCH 32.8 32.9   MCHC 33.3 34.2    RDW 50.4* 48.9   PLATELETCT 198 198   MPV 9.7 9.6     Recent Labs     05/08/24  0204 05/09/24  0124   SODIUM 140 145   POTASSIUM 3.7 3.8   CHLORIDE 105 107   CO2 24 27   GLUCOSE 115* 109*   BUN 28* 30*   CREATININE 1.17 1.34   CALCIUM 9.0 8.7     Recent Labs     05/08/24  0204   APTT 26.4   INR 1.29*               Imaging  US-RENAL   Final Result      1. Bilateral medical renal disease.   2. Lobular contour of both kidneys with renal cortical thinning.   3. No hydronephrosis.   4. The bladder is decompressed around a Wahl catheter.      US-THORACENTESIS PUNCTURE LEFT   Final Result      1. Ultrasound guided left sided diagnostic and therapeutic thoracentesis.      2. 900 mL of fluid withdrawn.      DX-CHEST-PORTABLE (1 VIEW)   Final Result      1.  Left-sided thoracentesis with interval decrease in confluent opacity at the left lower hemithorax compatible with pleural fluid evacuation. No postprocedural pneumothorax.   2.  Bibasilar atelectatic opacities, left greater than right.      EC-ECHOCARDIOGRAM COMPLETE W/O CONT   Final Result      DX-CHEST-LIMITED (1 VIEW)   Final Result         1.  Pulmonary edema and/or infiltrates, decreased since prior.   2.  Small bilateral pleural effusion   3.  Cardiomegaly   4.  Atherosclerosis      OUTSIDE IMAGES-DX CHEST   Final Result           Assessment/Plan  * Acute respiratory failure with hypoxia (HCC)- (present on admission)  Assessment & Plan  IV Lasix for diuresis  RT protocol    Pleural effusion- (present on admission)  Assessment & Plan  Ultrasound guided left sided diagnostic and therapeutic thoracentesis, 900 mL of fluid withdrawn  IV Lasix for diuresis  Follow fluid culture and cytology  Repeat chest x-ray tomorrow    CAP (community acquired pneumonia)- (present on admission)  Assessment & Plan  IV Unasyn, doxycycline    Acute pulmonary edema (HCC)- (present on admission)  Assessment & Plan  IV Lasix diuresis    HTN (hypertension), benign- (present on  admission)  Assessment & Plan  Diltiazem  Hold Losartan  IV Labetalol and hydralazine as needed with parameters    Hypomagnesemia- (present on admission)  Assessment & Plan  IV Mg 2 g  Follow level    Stage 3a chronic kidney disease- (present on admission)  Assessment & Plan  Follow bmp    Anemia, chronic disease- (present on admission)  Assessment & Plan  Follow cbc    Prediabetes- (present on admission)  Assessment & Plan  Hgba1c 5.8    Urinary retention- (present on admission)  Assessment & Plan  Wahl placed 5/7/2024  Flomax    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 In review of yesterday's note (5/8/2024), there are no changes except as documented above.

## 2024-05-09 NOTE — PROGRESS NOTES
Telemetry Shift Summary    Rhythm AFib  HR Range 73-87  Ectopy PVC, BIG, COUP  Measurements -/.14/-        Normal Values  Rhythm SR  HR Range    Measurements 0.12-0.20 / 0.06-0.10  / 0.30-0.52

## 2024-05-09 NOTE — THERAPY
Occupational Therapy Contact Note    Patient Name: Miguel Reyna  Age:  89 y.o., Sex:  male  Medical Record #: [id number]  Today's Date: 5/9/2024    OT consult received. History collected and education regarding the role of OT and energy conservation principles (handout) provided. Pt declined eval reporting no concerns at this time. Pt's spouse present for interaction and reported comfort taking pt home and providing assist as needed. Discussed with RN. Order will be completed. Please re-order should status change or concerns arise.      05/09/24 1101   Prior Living Situation   Prior Services None   Housing / Facility 1 Story House;2 Story House  (plans to stay in 1SH upon d/c)   Steps Into Home 2   Bathroom Set up Walk In Shower;Bathtub / Shower Combination;Shower Chair;Grab Bars   Equipment Owned None   Lives with - Patient's Self Care Capacity Spouse   Comments Pt's spouse present and supportive.   Prior Level of ADL Function   Self Feeding Independent   Grooming / Hygiene Independent   Bathing Independent   Dressing Independent   Toileting Independent   Prior Level of IADL Function   Medication Management Independent   Laundry Independent   Kitchen Mobility Independent   Finances Independent   Home Management Independent   Shopping Independent   Prior Level Of Mobility Independent Without Device in Community;Independent Without Device in Home   Driving / Transportation Driving Independent   Occupation (Pre-Hospital Vocational) Retired Due To Age

## 2024-05-09 NOTE — FACE TO FACE
"Face to Face Note  -  Durable Medical Equipment    Olivier Woods M.D. - NPI: [id number]  I certify that this patient is under my care and that they had a durable medical equipment(DME)face to face encounter by myself that meets the physician DME face-to-face encounter requirements with this patient on:    Date of encounter:   Patient:                    MRN:                       YOB: 2024  Miguel Reyna  [id number]  12/20/1934     The encounter with the patient was in whole, or in part, for the following medical condition, which is the primary reason for durable medical equipment:  Cardiac Disease    I certify that, based on my findings, the following durable medical equipment is medically necessary:    Oxygen   HOME O2 Saturation Measurements:(Values must be present for Home Oxygen orders)  Room air sat at rest: 96  Room air sat with amb: 87  With liters of O2: 1, O2 sat at rest with O2: 98  With Liters of O2: 2, O2 sat with amb with O2 : 90     If patient feels more short of breath, they can go up to 6 liters per minute and contact healthcare provider.    Supporting Symptoms: The patient requires supplemental oxygen, as the following interventions have been tried with limited or no improvement: \"Incentive spirometry.    My Clinical findings support the need for the above equipment due to:  Hypoxia  "

## 2024-05-09 NOTE — CARE PLAN
The patient is Stable - Low risk of patient condition declining or worsening    Shift Goals  Clinical Goals: safety and comfort, pain control  Patient Goals: rest  Family Goals: home    Progress made toward(s) clinical / shift goals:    Problem: Knowledge Deficit - Standard  Goal: Patient and family/care givers will demonstrate understanding of plan of care, disease process/condition, diagnostic tests and medications  Outcome: Progressing     Problem: Care Map:  Admission Optimal Outcome for the Heart Failure Patient  Goal: Admission:  Optimal Care of the heart failure patient  Outcome: Progressing     Problem: Fluid Volume  Goal: Fluid volume balance will be maintained  Outcome: Progressing       Patient is not progressing towards the following goals:

## 2024-05-09 NOTE — CARE PLAN
The patient is Stable - Low risk of patient condition declining or worsening    Shift Goals  Clinical Goals: safety and comfort, pain control  Patient Goals: rest  Family Goals: home    Progress made toward(s) clinical / shift goals:    Problem: Knowledge Deficit - Standard  Goal: Patient and family/care givers will demonstrate understanding of plan of care, disease process/condition, diagnostic tests and medications  Outcome: Progressing     Problem: Respiratory  Goal: Patient will achieve/maintain optimum respiratory ventilation and gas exchange  Outcome: Progressing     Problem: Fall Risk  Goal: Patient will remain free from falls  Outcome: Progressing

## 2024-05-09 NOTE — PROGRESS NOTES
Monitor Summary:    Rhythm: Afib     HR:     Measurements:-/.06/-    Ectopy: oPVC, rBig           Normal Values  Rhythm SR  HR Range    Measurements 0.12-0.20 / 0.06-0.10  / 0.30-0.52

## 2024-05-09 NOTE — DISCHARGE PLANNING
Care Transition Team Assessment       Patient is 89-year-old male admitted for acute respiratory failure with hypoxia. Patient is alert and oriented x4; pt's spouse at bedside and assisted with some of the assessment. Please see pt's H&P for prior medical history. Patient reports having two homes; one in [place] (on facesheet) and at [address] [place] [id number]. Patient reports PCP is Juan Serna. Patient reports, pt is independent with ADL's and does not utilize DME. Patient confirmed medical insurance is Medicare and [facility]. Patient reports, patient has means to transportation and informed this writer; pt's wife will provide transport once medically stable for discharge. Per pt, they will be going to their home in [place] after dc.    Information Source  Information Given By: Patient, Spouse  Informant's Name: No  Who is responsible for making decisions for patient? : Patient    Readmission Evaluation  Is this a readmission?: No    Interdisciplinary Discharge Planning  Primary Care Physician: Juan Serna  Lives with - Patient's Self Care Capacity: Spouse  Patient or legal guardian wants to designate a caregiver: No  Support Systems: Family Member(s)  Housing / Facility: 1 Story House, 2 Story House (plans to stay in 1SH upon d/c)  Prior Services: None    Discharge Preparedness  What is your plan after discharge?: Home with help  What are your discharge supports?: Spouse  Prior Functional Level: Ambulatory, Independent with Activities of Daily Living, Drives Self, Independent with Medication Management  Difficulity with ADLs: None  Difficulity with IADLs: None    Functional Assesment  Prior Functional Level: Ambulatory, Independent with Activities of Daily Living, Drives Self, Independent with Medication Management    Vision / Hearing Impairment  Vision Impairment : No  Hearing Impairment : No    Domestic Abuse  Have you ever been the victim of abuse or violence?: No  Physical  Abuse or Sexual Abuse: No  Verbal Abuse or Emotional Abuse: No  Possible Abuse/Neglect Reported to:: Not Applicable    Anticipated Discharge Information  Discharge Disposition: Discharged to home/self care (01)

## 2024-05-09 NOTE — THERAPY
Physical Therapy Education Note    Patient Name: Miguel Reyna  Age:  89 y.o., Sex:  male  Medical Record #: [id number]  Today's Date: 5/9/2024    PT eval attempted. Pt reporting no concerns regarding mobility upon d/c home with his spouse, declined PT eval. States he has been ambulating <> BR with no AD without difficulty. Receptive to education regarding energy conservation and activity pacing strategies as pt endorses SOB with mobility. Will sign off. Please re-consult if change in mobility status.     Angel Brumfield PT, DPT

## 2024-05-10 ENCOUNTER — PHARMACY VISIT (OUTPATIENT)
Dept: PHARMACY | Facility: MEDICAL CENTER | Age: 89
End: 2024-05-10
Payer: COMMERCIAL

## 2024-05-10 ENCOUNTER — APPOINTMENT (OUTPATIENT)
Dept: RADIOLOGY | Facility: MEDICAL CENTER | Age: 89
DRG: 186 | End: 2024-05-10
Attending: FAMILY MEDICINE
Payer: MEDICARE

## 2024-05-10 VITALS
SYSTOLIC BLOOD PRESSURE: 148 MMHG | HEIGHT: 71 IN | RESPIRATION RATE: 18 BRPM | DIASTOLIC BLOOD PRESSURE: 70 MMHG | TEMPERATURE: 97.7 F | HEART RATE: 87 BPM | OXYGEN SATURATION: 96 % | WEIGHT: 183.2 LBS | BODY MASS INDEX: 25.65 KG/M2

## 2024-05-10 LAB
ANION GAP SERPL CALC-SCNC: 11 MMOL/L (ref 7–16)
BACTERIA UR CULT: NORMAL
BUN SERPL-MCNC: 32 MG/DL (ref 8–22)
CALCIUM SERPL-MCNC: 8.5 MG/DL (ref 8.5–10.5)
CHLORIDE SERPL-SCNC: 102 MMOL/L (ref 96–112)
CO2 SERPL-SCNC: 26 MMOL/L (ref 20–33)
CREAT SERPL-MCNC: 1.17 MG/DL (ref 0.5–1.4)
ERYTHROCYTE [DISTWIDTH] IN BLOOD BY AUTOMATED COUNT: 49.1 FL (ref 35.9–50)
GFR SERPLBLD CREATININE-BSD FMLA CKD-EPI: 59 ML/MIN/1.73 M 2
GLUCOSE SERPL-MCNC: 104 MG/DL (ref 65–99)
HCT VFR BLD AUTO: 34 % (ref 42–52)
HGB BLD-MCNC: 11.2 G/DL (ref 14–18)
MAGNESIUM SERPL-MCNC: 2.1 MG/DL (ref 1.5–2.5)
MCH RBC QN AUTO: 32.3 PG (ref 27–33)
MCHC RBC AUTO-ENTMCNC: 32.9 G/DL (ref 32.3–36.5)
MCV RBC AUTO: 98 FL (ref 81.4–97.8)
NT-PROBNP SERPL IA-MCNC: 1078 PG/ML (ref 0–125)
PLATELET # BLD AUTO: 177 K/UL (ref 164–446)
PMV BLD AUTO: 9.8 FL (ref 9–12.9)
POTASSIUM SERPL-SCNC: 3.3 MMOL/L (ref 3.6–5.5)
RBC # BLD AUTO: 3.47 M/UL (ref 4.7–6.1)
SIGNIFICANT IND 70042: NORMAL
SITE SITE: NORMAL
SODIUM SERPL-SCNC: 139 MMOL/L (ref 135–145)
SOURCE SOURCE: NORMAL
WBC # BLD AUTO: 5 K/UL (ref 4.8–10.8)

## 2024-05-10 PROCEDURE — RXMED WILLOW AMBULATORY MEDICATION CHARGE: Performed by: FAMILY MEDICINE

## 2024-05-10 PROCEDURE — A9270 NON-COVERED ITEM OR SERVICE: HCPCS | Mod: JZ | Performed by: FAMILY MEDICINE

## 2024-05-10 PROCEDURE — 80048 BASIC METABOLIC PNL TOTAL CA: CPT

## 2024-05-10 PROCEDURE — 85027 COMPLETE CBC AUTOMATED: CPT

## 2024-05-10 PROCEDURE — 83735 ASSAY OF MAGNESIUM: CPT

## 2024-05-10 PROCEDURE — 99239 HOSP IP/OBS DSCHRG MGMT >30: CPT | Performed by: FAMILY MEDICINE

## 2024-05-10 PROCEDURE — 36415 COLL VENOUS BLD VENIPUNCTURE: CPT

## 2024-05-10 PROCEDURE — 83880 ASSAY OF NATRIURETIC PEPTIDE: CPT

## 2024-05-10 PROCEDURE — 71046 X-RAY EXAM CHEST 2 VIEWS: CPT

## 2024-05-10 PROCEDURE — 700102 HCHG RX REV CODE 250 W/ 637 OVERRIDE(OP): Mod: JZ | Performed by: FAMILY MEDICINE

## 2024-05-10 PROCEDURE — 700102 HCHG RX REV CODE 250 W/ 637 OVERRIDE(OP): Performed by: STUDENT IN AN ORGANIZED HEALTH CARE EDUCATION/TRAINING PROGRAM

## 2024-05-10 PROCEDURE — A9270 NON-COVERED ITEM OR SERVICE: HCPCS | Performed by: STUDENT IN AN ORGANIZED HEALTH CARE EDUCATION/TRAINING PROGRAM

## 2024-05-10 PROCEDURE — 700105 HCHG RX REV CODE 258: Performed by: STUDENT IN AN ORGANIZED HEALTH CARE EDUCATION/TRAINING PROGRAM

## 2024-05-10 PROCEDURE — 700111 HCHG RX REV CODE 636 W/ 250 OVERRIDE (IP): Mod: JZ | Performed by: STUDENT IN AN ORGANIZED HEALTH CARE EDUCATION/TRAINING PROGRAM

## 2024-05-10 PROCEDURE — 700111 HCHG RX REV CODE 636 W/ 250 OVERRIDE (IP): Performed by: FAMILY MEDICINE

## 2024-05-10 RX ORDER — POTASSIUM CHLORIDE 20 MEQ/1
40 TABLET, EXTENDED RELEASE ORAL DAILY
Status: DISCONTINUED | OUTPATIENT
Start: 2024-05-10 | End: 2024-05-10 | Stop reason: HOSPADM

## 2024-05-10 RX ORDER — POTASSIUM CHLORIDE 20 MEQ/1
20 TABLET, EXTENDED RELEASE ORAL DAILY
Qty: 30 TABLET | Refills: 0 | Status: SHIPPED | OUTPATIENT
Start: 2024-05-10

## 2024-05-10 RX ORDER — FUROSEMIDE 20 MG/1
20 TABLET ORAL DAILY
Qty: 5 TABLET | Refills: 0 | Status: SHIPPED | OUTPATIENT
Start: 2024-05-10 | End: 2024-05-15

## 2024-05-10 RX ADMIN — FUROSEMIDE 40 MG: 10 INJECTION, SOLUTION INTRAMUSCULAR; INTRAVENOUS at 05:10

## 2024-05-10 RX ADMIN — POTASSIUM CHLORIDE 40 MEQ: 1500 TABLET, EXTENDED RELEASE ORAL at 09:03

## 2024-05-10 RX ADMIN — BENZONATATE 100 MG: 100 CAPSULE ORAL at 05:10

## 2024-05-10 RX ADMIN — DOXYCYCLINE 100 MG: 100 TABLET, FILM COATED ORAL at 05:10

## 2024-05-10 RX ADMIN — TAMSULOSIN HYDROCHLORIDE 0.4 MG: 0.4 CAPSULE ORAL at 05:10

## 2024-05-10 RX ADMIN — AMPICILLIN AND SULBACTAM 3 G: 1; 2 INJECTION, POWDER, FOR SOLUTION INTRAMUSCULAR; INTRAVENOUS at 05:22

## 2024-05-10 RX ADMIN — Medication 1 TABLET: at 05:10

## 2024-05-10 RX ADMIN — DILTIAZEM HYDROCHLORIDE 240 MG: 240 CAPSULE, EXTENDED RELEASE ORAL at 05:10

## 2024-05-10 ASSESSMENT — PAIN DESCRIPTION - PAIN TYPE: TYPE: ACUTE PAIN

## 2024-05-10 NOTE — DISCHARGE PLANNING
1620, Portable O2 tank delivered at bedside. Pt said the O2 concentrator will be delivered at their home.

## 2024-05-10 NOTE — DISCHARGE PLANNING
1011  [facility] [employer] O2 order to [facility] @1010 per choice form.     1145  Agency/Facility Name: [facility]   Spoke To: Emi  Outcome: [facility] called regarding order, per Emi order has been received and it will be processed. Per Emi no ETA of delivery yet until order has been processed.     1320  Agency/Facility Name: [facility]   Outcome: Raffy left VM to notify that the order has been forwarded to the [facility] and is waiting for [facility] to process the order and notify [facility] to deliver DME.     1425  Agency/Facility Name: [facility]  Spoke To: Kika   Outcome: [facility] called to ask if order has been processed, per Kika yes it has been processed and will notify [facility] to deliver DME at bedside. MARIS Connolly notified.     1437  Agency/Facility Name: [facility]   Spoke To: Emi   Outcome: DPA called if they have gotten the okay to deliver from [facility] and per Emi yes they got the okay to delivery and ETA of delivery is no more than 2 hours.

## 2024-05-10 NOTE — PROGRESS NOTES
Bedside report recieved and patient care assumed. Pt is resting in bed, A&O 4 with no complaints of pain, and is on 1L NC. Tele box on, all fall precautions are in place, belongings at bedisde table. Pt was updated on POC, no questions or concerns. Patient educated on the use of call light for assistance.

## 2024-05-10 NOTE — CARE PLAN
Problem: Knowledge Deficit - Standard  Goal: Patient and family/care givers will demonstrate understanding of plan of care, disease process/condition, diagnostic tests and medications  Outcome: Progressing     Problem: Hemodynamics  Goal: Patient's hemodynamics, fluid balance and neurologic status will be stable or improve  Outcome: Progressing     Problem: Fluid Volume  Goal: Fluid volume balance will be maintained  Outcome: Progressing     Problem: Respiratory  Goal: Patient will achieve/maintain optimum respiratory ventilation and gas exchange  Outcome: Progressing   The patient is Stable - Low risk of patient condition declining or worsening    Shift Goals  Clinical Goals: antibiotics, wean oxygen  Patient Goals: rest  Family Goals: janelle    Progress made toward(s) clinical / shift goals:  patient maintaining adequate urine output. Maintaining adequate oxygenation and ventilation      Patient is not progressing towards the following goals:

## 2024-05-10 NOTE — DISCHARGE SUMMARY
Discharge Summary    CHIEF COMPLAINT ON ADMISSION  No chief complaint on file.      Reason for Admission  CHF, HYPOXIA     Admission Date  5/8/2024    CODE STATUS  Full Code    HPI & HOSPITAL COURSE  This is a 89 y.o. male here with Bilateral pleural effusion, left greater than right, pulmonary edema, possible pneumonia. He was started on IV Lasix for diuresis, IV Unasyn and doxycycline. Patient with known history of persistent atrial fibrillation, he was on anticoagulation with Eliquis which was held. Ultrasound-guided thoracentesis was done on the left pleural effusion.  Pleural fluid culture appeared to be negative, cytology was negative for malignant cells but did show evidence of possible chronic inflammation.  Patient diuresed well.  Cardiogram showed ejection fraction 55%.  Repeat chest x-ray showed minimal left pleural effusion.  He continued to require oxygen supplementation, and home O2 was arranged.  His atrial fibrillation appears to be rate controlled.  On these findings, it is recommended he follow-up with his cardiologist, also needs to establish with pulmonary medicine.  Plan of care and discharge plan was discussed with the patient's spouse who was at bedside.      Therefore, he is discharged in good and stable condition to home with close outpatient follow-up.    The patient met 2-midnight criteria for an inpatient stay at the time of discharge.    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 [facility] None     [facility] - PULMONARY MEDICINE  [address]  [place] [id number]  [phone]  Follow up        MEDICATIONS ON DISCHARGE     Medication List        START taking these medications        Instructions   furosemide 20 MG Tabs  Commonly known as: Lasix   Take 1 Tablet by mouth every day for 5 days.  Dose: 20 mg            CONTINUE taking these medications        Instructions   Abiraterone Acetate 250 MG Tabs   Take 1,000 mg by mouth every day.  Dose: 1,000 mg     apixaban 2.5mg Tabs  Commonly known as: Eliquis   Take 2.5 mg by mouth 2 times a day.  Dose: 2.5 mg     ascorbic acid 500 MG Tabs  Commonly known as: Ascorbic Acid   Take 500 mg by mouth every day.  Dose: 500 mg     atorvastatin 40 MG Tabs  Commonly known as: Lipitor   Take 40 mg by mouth every day.  Dose: 40 mg     dilTIAZem  MG Cp24  Commonly known as: Cardizem CD   Take 300 mg by mouth every day.  Dose: 300 mg     losartan 50 MG Tabs  Commonly known as: Cozaar   Take 50 mg by mouth every day.  Dose: 50 mg     nitroglycerin 0.4 MG Subl  Commonly known as: Nitrostat   Place 0.4 mg under the tongue every 5 minutes as needed for Chest Pain.  Dose: 0.4 mg     potassium chloride SA 20 MEQ Tbcr  Commonly known as: Kdur   Take 20 mEq by mouth 2 times a day. X 3 days  Dose: 20 mEq     predniSONE 5 MG Tabs  Commonly known as: Deltasone   Take 5 mg by mouth every day.  Dose: 5 mg     therapeutic multivitamin-minerals Tabs   Take 1 Tab by mouth every day.  Dose: 1 Tablet            STOP taking these medications      levoFLOXacin 500 MG tablet  Commonly known as: Levaquin      sulfamethoxazole-trimethoprim 800-160 MG tablet  Commonly known as: Bactrim DS              Allergies  No Known Allergies    DIET  Orders Placed This Encounter   Procedures    Diet Order Diet: 2 Gram Sodium; Fluid modifications: (optional): 1500 ml Fluid Restriction     Standing Status:   Standing     Number of Occurrences:   1     Order Specific Question:   Diet:     Answer:   2 Gram Sodium [7]     Order Specific Question:   Fluid modifications: (optional)     Answer:   1500 ml Fluid Restriction [9]       ACTIVITY  As tolerated.  Weight bearing as tolerated    CONSULTATIONS  None    PROCEDURES  None    LABORATORY  Lab Results   Component Value Date    SODIUM 139 05/10/2024    POTASSIUM 3.3 (L) 05/10/2024    CHLORIDE 102 05/10/2024    CO2 26 05/10/2024    GLUCOSE 104 (H) 05/10/2024    BUN 32 (H) 05/10/2024    CREATININE 1.17 05/10/2024        Lab Results   Component Value Date    WBC 5.0 05/10/2024    HEMOGLOBIN 11.2 (L) 05/10/2024    HEMATOCRIT 34.0 (L) 05/10/2024    PLATELETCT 177 05/10/2024        Total time of the discharge process exceeds 40 minutes.

## 2024-05-11 NOTE — PROGRESS NOTES
Pt given discharge instructions.  Discussed diet, activity, follow up appointments, symptoms and management, and prescriptions provided.  Packet sent with patient.  IV d/c'd, tele box off, and all questions answered.  meds to beds and Oxygen delivered

## 2024-05-13 ENCOUNTER — OFFICE VISIT (OUTPATIENT)
Dept: CARDIOLOGY | Facility: MEDICAL CENTER | Age: 89
End: 2024-05-13
Attending: INTERNAL MEDICINE
Payer: MEDICARE

## 2024-05-13 ENCOUNTER — TELEPHONE (OUTPATIENT)
Dept: HEALTH INFORMATION MANAGEMENT | Facility: OTHER | Age: 89
End: 2024-05-13

## 2024-05-13 VITALS
DIASTOLIC BLOOD PRESSURE: 54 MMHG | HEART RATE: 58 BPM | OXYGEN SATURATION: 98 % | HEIGHT: 71 IN | RESPIRATION RATE: 14 BRPM | SYSTOLIC BLOOD PRESSURE: 120 MMHG | WEIGHT: 190 LBS | BODY MASS INDEX: 26.6 KG/M2

## 2024-05-13 DIAGNOSIS — Z95.5 STENTED CORONARY ARTERY: ICD-10-CM

## 2024-05-13 DIAGNOSIS — Z79.01 CHRONIC ANTICOAGULATION: ICD-10-CM

## 2024-05-13 DIAGNOSIS — E78.00 HYPERCHOLESTEREMIA: ICD-10-CM

## 2024-05-13 DIAGNOSIS — Z86.79 HISTORY OF ATRIAL FIBRILLATION: ICD-10-CM

## 2024-05-13 DIAGNOSIS — I10 HTN (HYPERTENSION), BENIGN: ICD-10-CM

## 2024-05-13 DIAGNOSIS — I50.31 ACUTE DIASTOLIC CONGESTIVE HEART FAILURE (HCC): ICD-10-CM

## 2024-05-13 DIAGNOSIS — I25.10 CORONARY ARTERY DISEASE INVOLVING NATIVE CORONARY ARTERY OF NATIVE HEART WITHOUT ANGINA PECTORIS: ICD-10-CM

## 2024-05-13 LAB
BACTERIA BLD CULT: NORMAL
BACTERIA BLD CULT: NORMAL
BACTERIA FLD AEROBE CULT: NORMAL
GRAM STN SPEC: NORMAL
SIGNIFICANT IND 70042: NORMAL
SITE SITE: NORMAL
SOURCE SOURCE: NORMAL

## 2024-05-13 PROCEDURE — 3074F SYST BP LT 130 MM HG: CPT | Performed by: INTERNAL MEDICINE

## 2024-05-13 PROCEDURE — 99204 OFFICE O/P NEW MOD 45 MIN: CPT | Performed by: INTERNAL MEDICINE

## 2024-05-13 PROCEDURE — 3078F DIAST BP <80 MM HG: CPT | Performed by: INTERNAL MEDICINE

## 2024-05-13 ASSESSMENT — ENCOUNTER SYMPTOMS
PSYCHIATRIC NEGATIVE: 1
DOUBLE VISION: 0
FEVER: 0
BLURRED VISION: 0
PALPITATIONS: 0
WEIGHT LOSS: 0
DEPRESSION: 0
CONSTITUTIONAL NEGATIVE: 1
NAUSEA: 0
MUSCULOSKELETAL NEGATIVE: 1
SHORTNESS OF BREATH: 0
GASTROINTESTINAL NEGATIVE: 1
COUGH: 0
CHILLS: 0
MYALGIAS: 0
ABDOMINAL PAIN: 0
NERVOUS/ANXIOUS: 0
BRUISES/BLEEDS EASILY: 0
WEAKNESS: 0
VOMITING: 0
FOCAL WEAKNESS: 0
DIZZINESS: 0
RESPIRATORY NEGATIVE: 1
EYES NEGATIVE: 1
NEUROLOGICAL NEGATIVE: 1
CLAUDICATION: 0
HEADACHES: 0
CARDIOVASCULAR NEGATIVE: 1

## 2024-05-13 ASSESSMENT — FIBROSIS 4 INDEX: FIB4 SCORE: 2.64

## 2024-05-13 NOTE — PROGRESS NOTES
Chief Complaint   Patient presents with    Shortness of Breath    New Patient     N/P Dx: Fluid in Lungs       Subjective     Miguel Reyna is a 89 y.o. male who presents today for new patient establishment for congestive heart failure.    Mr. Reyna is a 89 year old male with coronary artery disease with prior stent placement, atrial fibrillation on chronic anticoagulation therapy, congestive heart failure, hypertension and hyperlipidemia, recurrent prostate cancer treated at [facility], lifelong nonsmoker, family history of coronary artery disease with his mother and father. He was recently admitted to [facility] for congestive heart failure with pleural effusion treated with thoracentesis. Since discharge he has been doing well. He denies chest pain, shortness of breath, palpitations, nausea/vomiting or diaphoresis. He and his wife lives part time in [place] and he is planning to return to [place] to follow up with his cardiologist who he has been following since 2011.     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/24/2011    Prostate CA ([facility]) 01/01/2018     Past Surgical History:   Procedure Laterality Date    IRRIGATION & DEBRIDEMENT ORTHO Left 06/30/2018    Procedure: IRRIGATION & DEBRIDEMENT HAND;  Surgeon: Tien Ta M.D.;  Location: SURGERY [facility];  Service: Orthopedics    TURP-VAPOR  10/01/2012    OTHER ORTHOPEDIC SURGERY  1988    Shoulder bone spur    ANGIOPLASTY      Z CARDIAC CATH       Family History   Problem Relation Age of Onset    Heart Disease Mother     Heart Disease Father      Social History     Socioeconomic History    Marital status:      Spouse name: Not on file    Number of children: Not on file    Years of education: Not on file    Highest education level: Not on file   Occupational History    Not on  Pt. mom took him to ER on 2/5/24 and he was admitted   file   Tobacco Use    Smoking status: Never    Smokeless tobacco: Never   Vaping Use    Vaping Use: Never used   Substance and Sexual Activity    Alcohol use: Yes    Drug use: No    Sexual activity: Not on file   Other Topics Concern    Not on file   Social History Narrative    Not on file     Social Determinants of Health     Financial Resource Strain: Not on file   Food Insecurity: Not on file   Transportation Needs: Not on file   Physical Activity: Not on file   Stress: Not on file   Social Connections: Not on file   Intimate Partner Violence: Not on file   Housing Stability: Not on file     No Known Allergies    (Medications reviewed.)  Outpatient Encounter Medications as of 5/13/2024   Medication Sig Dispense Refill    furosemide (LASIX) 20 MG Tab Take 1 Tablet by mouth every day for 5 days. 5 Tablet 0    potassium chloride SA (KDUR) 20 MEQ Tab CR Take 1 Tablet by mouth every day. 30 Tablet 0    losartan (COZAAR) 50 MG Tab Take 50 mg by mouth every day.      dilTIAZem CD (CARDIZEM CD) 300 MG CAPSULE SR 24 HR Take 300 mg by mouth every day.      Abiraterone Acetate 250 MG Tab Take 1,000 mg by mouth every day.      apixaban (ELIQUIS) 2.5mg Tab Take 2.5 mg by mouth 2 times a day.      atorvastatin (LIPITOR) 40 MG Tab Take 40 mg by mouth every day.      nitroglycerin (NITROSTAT) 0.4 MG SL Tab Place 0.4 mg under the tongue every 5 minutes as needed for Chest Pain.      ascorbic acid (ASCORBIC ACID) 500 MG TABS Take 500 mg by mouth every day.      therapeutic multivitamin-minerals (THERAGRAN-M) TABS Take 1 Tab by mouth every day.      [DISCONTINUED] predniSONE (DELTASONE) 5 MG Tab Take 5 mg by mouth every day. (Patient not taking: Reported on 5/13/2024)       No facility-administered encounter medications on file as of 5/13/2024.     Review of Systems   Constitutional: Negative.  Negative for chills, fever, malaise/fatigue and weight loss.   HENT: Negative.  Negative for hearing loss.    Eyes: Negative.  Negative for  "blurred vision and double vision.   Respiratory: Negative.  Negative for cough and shortness of breath.    Cardiovascular: Negative.  Negative for chest pain, palpitations, claudication and leg swelling.   Gastrointestinal: Negative.  Negative for abdominal pain, nausea and vomiting.   Genitourinary: Negative.  Negative for dysuria and urgency.   Musculoskeletal: Negative.  Negative for joint pain and myalgias.   Skin: Negative.  Negative for itching and rash.   Neurological: Negative.  Negative for dizziness, focal weakness, weakness and headaches.   Endo/Heme/Allergies: Negative.  Does not bruise/bleed easily.   Psychiatric/Behavioral: Negative.  Negative for depression. The patient is not nervous/anxious.               Objective     /54 (BP Location: Left arm, Patient Position: Sitting, BP Cuff Size: Adult)   Pulse (!) 58   Resp 14   Ht 1.803 m (5' 11\")   Wt 86.2 kg (190 lb)   SpO2 98%   BMI 26.50 kg/m²     Physical Exam  Constitutional:       Appearance: Normal appearance. He is well-developed and normal weight.   HENT:      Head: Normocephalic and atraumatic.   Neck:      Vascular: No JVD.   Cardiovascular:      Rate and Rhythm: Normal rate and regular rhythm.      Heart sounds: Normal heart sounds.   Pulmonary:      Effort: Pulmonary effort is normal.      Breath sounds: Normal breath sounds.   Abdominal:      General: Bowel sounds are normal.      Palpations: Abdomen is soft.      Comments: No hepatosplenomegaly.   Musculoskeletal:         General: Normal range of motion.   Lymphadenopathy:      Cervical: No cervical adenopathy.   Skin:     General: Skin is warm and dry.   Neurological:      Mental Status: He is alert and oriented to person, place, and time.     Using oxygen.       CARDIAC STUDIES/PROCEDURES:    CARDIAC CATHETERIZATION CONCLUSIONS by Diego Ji (09/24/11)  Cardiac catheterization showing evidence of residual high grade stenosis of the   mid right coronary artery who " underwent successful coronary intervention of the   right coronary artery with 2 overlapping drug-eluting stents, 3.5 x 12-mm and 3.5 x  18-mm Xience stents .   (study result reviewed)     CT OF ABDOMEN [facility] (04/08/20)  There is atherosclerotic calcification of the abdominal aorta.   (study result reviewed)     ECHOCARDIOGRAM CONCLUSIONS (05/08/24)  Left ventricular systolic function is normal.  Mild aortic regurgitation.  (study result reviewed)     EKG performed on (05/08/24) was reviewed: EKG personally interpreted shows atrial fibrillation.     Assessment & Plan     1. Acute diastolic congestive heart failure (HCC)        2. Coronary artery disease involving native coronary artery of native heart without angina pectoris        3. Stented coronary artery        4. HTN (hypertension), benign        5. Hypercholesteremia        6. History of atrial fibrillation        7. Chronic anticoagulation            Medical Decision Making: Today's Assessment/Status/Plan:        Acute diastolic congestive heart failure: He is clinically doing well. The overall volume status is adequate. He is planning to go back to [place]. He will discuss starting SGLT 2 inhibitor, aldactone, discontinue diltiazem and change for metoprolol.   Coronary artery disease with stent placement: He is clinically doing well without recurrence of his angina. We will continue with current medical care including losartan, atorvastatin.  Hypertension: Blood pressure is well controlled.  Hyperlipidemia: He is doing well on statin therapy without myalgia symptoms. We will repeat labs including fasting lipid profile.   Chronic atrial fibrillation on anticoagulation therapy (Eliquis): He is doing well on anticoagulation without palpitations.   Greater than 45 minutes of time was spent to review all above information; of which more than 50% of the time was face to face reviewing thee patient's medical issues, medication evaluation, study result review,  lab results review, prior procedures, records from [facility], recent admission records.    We will follow up in 6 months.

## 2024-05-14 LAB
FUNGUS SPEC CULT: NORMAL
FUNGUS SPEC FUNGUS STN: NORMAL
SIGNIFICANT IND 70042: NORMAL
SITE SITE: NORMAL
SOURCE SOURCE: NORMAL

## 2024-06-23 LAB
MYCOBACTERIUM SPEC CULT: NORMAL
RHODAMINE-AURAMINE STN SPEC: NORMAL
SIGNIFICANT IND 70042: NORMAL
SITE SITE: NORMAL
SOURCE SOURCE: NORMAL

## 2024-07-04 ENCOUNTER — OFFICE VISIT (OUTPATIENT)
Dept: URGENT CARE | Facility: PHYSICIAN GROUP | Age: 89
End: 2024-07-04
Payer: MEDICARE

## 2024-07-04 VITALS
WEIGHT: 182 LBS | SYSTOLIC BLOOD PRESSURE: 120 MMHG | BODY MASS INDEX: 24.12 KG/M2 | OXYGEN SATURATION: 100 % | HEIGHT: 73 IN | TEMPERATURE: 97.9 F | HEART RATE: 76 BPM | RESPIRATION RATE: 16 BRPM | DIASTOLIC BLOOD PRESSURE: 64 MMHG

## 2024-07-04 DIAGNOSIS — J06.9 VIRAL URI WITH COUGH: ICD-10-CM

## 2024-07-04 DIAGNOSIS — J02.9 PHARYNGITIS, UNSPECIFIED ETIOLOGY: ICD-10-CM

## 2024-07-04 LAB
FLUAV RNA SPEC QL NAA+PROBE: NEGATIVE
FLUBV RNA SPEC QL NAA+PROBE: NEGATIVE
RSV RNA SPEC QL NAA+PROBE: NEGATIVE
S PYO DNA SPEC NAA+PROBE: NOT DETECTED
SARS-COV-2 RNA RESP QL NAA+PROBE: NEGATIVE

## 2024-07-04 PROCEDURE — 3078F DIAST BP <80 MM HG: CPT

## 2024-07-04 PROCEDURE — 99214 OFFICE O/P EST MOD 30 MIN: CPT | Mod: 25

## 2024-07-04 PROCEDURE — 87651 STREP A DNA AMP PROBE: CPT | Mod: 59

## 2024-07-04 PROCEDURE — 0241U POCT CEPHEID COV-2, FLU A/B, RSV - PCR: CPT

## 2024-07-04 PROCEDURE — 3074F SYST BP LT 130 MM HG: CPT

## 2024-07-04 RX ORDER — FUROSEMIDE 20 MG/1
20 TABLET ORAL
COMMUNITY
Start: 2024-06-18

## 2024-07-04 RX ORDER — BENZONATATE 100 MG/1
100 CAPSULE ORAL 3 TIMES DAILY PRN
Qty: 60 CAPSULE | Refills: 0 | Status: SHIPPED | OUTPATIENT
Start: 2024-07-04

## 2024-07-04 ASSESSMENT — FIBROSIS 4 INDEX: FIB4 SCORE: 2.64

## 2024-07-04 ASSESSMENT — ENCOUNTER SYMPTOMS
SPUTUM PRODUCTION: 1
FEVER: 0
COUGH: 1
SORE THROAT: 1

## 2025-03-28 ENCOUNTER — TELEPHONE (OUTPATIENT)
Dept: CARDIOLOGY | Facility: MEDICAL CENTER | Age: OVER 89
End: 2025-03-28
Payer: MEDICARE

## 2025-03-28 DIAGNOSIS — E78.00 HYPERCHOLESTEREMIA: ICD-10-CM

## 2025-03-28 DIAGNOSIS — Z95.5 STENTED CORONARY ARTERY: ICD-10-CM

## 2025-03-28 DIAGNOSIS — I25.10 CORONARY ARTERY DISEASE INVOLVING NATIVE CORONARY ARTERY OF NATIVE HEART WITHOUT ANGINA PECTORIS: ICD-10-CM

## (undated) DEVICE — CANISTER SUCTION 3000ML MECHANICAL FILTER AUTO SHUTOFF MEDI-VAC NONSTERILE LF DISP  (40EA/CA)

## (undated) DEVICE — GOWN WARMING STANDARD FLEX - (30/CA)

## (undated) DEVICE — ELECTRODE 850 FOAM ADHESIVE - HYDROGEL RADIOTRNSPRNT (50/PK)

## (undated) DEVICE — MASK ANESTHESIA ADULT  - (100/CA)

## (undated) DEVICE — STRIP PACKING STERILE 1/4 IN - 1/4 IN X 5 YDS (IODOFORM)

## (undated) DEVICE — TUBE, CULTURE AEROBIC

## (undated) DEVICE — SUCTION INSTRUMENT YANKAUER BULBOUS TIP W/O VENT (50EA/CA)

## (undated) DEVICE — HEAD HOLDER JUNIOR/ADULT

## (undated) DEVICE — GLOVE BIOGEL INDICATOR SZ 8 SURGICAL PF LTX - (50/BX 4BX/CA)

## (undated) DEVICE — CONTAINER SPECIMEN BAG OR - STERILE 4 OZ W/LID (100EA/CA)

## (undated) DEVICE — GLOVE BIOGEL PI ORTHO SZ 8 PF LF (40PR/BX)

## (undated) DEVICE — GLOVE SZ 8 BIOGEL PI MICRO - PF LF (50PR/BX)

## (undated) DEVICE — KIT ROOM DECONTAMINATION

## (undated) DEVICE — DETERGENT RENUZYME PLUS 10 OZ PACKET (50/BX)

## (undated) DEVICE — CORDS BIPOLAR COAGULATION - 12FT STERILE DISP. (10EA/BX)

## (undated) DEVICE — TUBING CLEARLINK DUO-VENT - C-FLO (48EA/CA)

## (undated) DEVICE — KIT ANESTHESIA W/CIRCUIT & 3/LT BAG W/FILTER (20EA/CA)

## (undated) DEVICE — SWAB ANAEROBIC SPEC.COLLECTOR - (25/PK 4PK/CA 100EA/CA)

## (undated) DEVICE — HANDPIECE 10FT INTPLS SCT PLS IRRIGATION HAND CONTROL SET (6/PK)

## (undated) DEVICE — SET LEADWIRE 5 LEAD BEDSIDE DISPOSABLE ECG (1SET OF 5/EA)

## (undated) DEVICE — WATER IRRIG. STER. 1500 ML - (9/CA)

## (undated) DEVICE — SODIUM CHL IRRIGATION 0.9% 1000ML (12EA/CA)

## (undated) DEVICE — CHLORAPREP 26 ML APPLICATOR - ORANGE TINT(25/CA)

## (undated) DEVICE — SUTURE GENERAL

## (undated) DEVICE — TIP INTPLS HFLO ML ORFC BTRY - (12/CS)  FOR SURGILAV

## (undated) DEVICE — SET IRRIGATION CYSTOSCOPY TUBE L80 IN (20EA/CA)

## (undated) DEVICE — SUTURE 3-0 ETHILON FS-1 - (36/BX) 30 INCH

## (undated) DEVICE — PACK LOWER EXTREMITY - (2/CA)

## (undated) DEVICE — PROTECTOR ULNA NERVE - (36PR/CA)

## (undated) DEVICE — SET EXTENSION WITH 2 PORTS (48EA/CA) ***PART #2C8610 IS A SUBSTITUTE*****

## (undated) DEVICE — ELECTRODE DUAL RETURN W/ CORD - (50/PK)

## (undated) DEVICE — SENSOR SPO2 NEO LNCS ADHESIVE (20/BX) SEE USER NOTES

## (undated) DEVICE — GLOVE BIOGEL PI INDICATOR SZ 8.0 SURGICAL PF LF -(50/BX 4BX/CA)

## (undated) DEVICE — LACTATED RINGERS INJ 1000 ML - (14EA/CA 60CA/PF)

## (undated) DEVICE — NEPTUNE 4 PORT MANIFOLD - (20/PK)